# Patient Record
Sex: FEMALE | Race: WHITE
[De-identification: names, ages, dates, MRNs, and addresses within clinical notes are randomized per-mention and may not be internally consistent; named-entity substitution may affect disease eponyms.]

---

## 2017-05-08 ENCOUNTER — HOSPITAL ENCOUNTER (OUTPATIENT)
Dept: HOSPITAL 57 - HPCALD | Age: 62
End: 2017-05-08
Attending: PHYSICIAN ASSISTANT
Payer: COMMERCIAL

## 2017-05-08 DIAGNOSIS — N39.0: Primary | ICD-10-CM

## 2017-05-08 PROCEDURE — 87086 URINE CULTURE/COLONY COUNT: CPT

## 2017-07-03 ENCOUNTER — HOSPITAL ENCOUNTER (OUTPATIENT)
Dept: HOSPITAL 57 - HPCALD | Age: 62
Discharge: HOME | End: 2017-07-03
Attending: PHYSICIAN ASSISTANT
Payer: MEDICARE

## 2017-07-03 DIAGNOSIS — N39.0: Primary | ICD-10-CM

## 2017-07-03 PROCEDURE — 87086 URINE CULTURE/COLONY COUNT: CPT

## 2017-07-17 ENCOUNTER — HOSPITAL ENCOUNTER (OUTPATIENT)
Dept: HOSPITAL 57 - HPCALD | Age: 62
End: 2017-07-17
Attending: PHYSICIAN ASSISTANT
Payer: MEDICARE

## 2017-07-17 DIAGNOSIS — F31.62: Primary | ICD-10-CM

## 2017-07-17 PROCEDURE — 36415 COLL VENOUS BLD VENIPUNCTURE: CPT

## 2017-07-17 PROCEDURE — 80178 ASSAY OF LITHIUM: CPT

## 2017-07-19 ENCOUNTER — HOSPITAL ENCOUNTER (OUTPATIENT)
Dept: HOSPITAL 57 - BURULT | Age: 62
Discharge: HOME | End: 2017-07-19
Payer: MEDICARE

## 2017-07-19 DIAGNOSIS — R10.9: Primary | ICD-10-CM

## 2017-07-19 PROCEDURE — 76770 US EXAM ABDO BACK WALL COMP: CPT

## 2017-07-19 NOTE — ULT
BILATERAL RENAL ULTRASOUND 

7/19/17

 

Ultrasonography of the kidneys was performed for evaluation of flank pain. Documentary images and wo
rksheets were provided and reviewed.

 

The right kidney measures 9.7 x 4.9 x 4.3 cm and the left measures 11.0 x 5.0 x 4.0 cm. No mass or h
ydronephrosis was seen in either. The cortical thickness was slightly thinned bilaterally but symmet
rical. There were no focal renal abnormalities of concern. 

 

No masses were seen within the urinary bladder. The wall thickness was borderline at 3 mm. 

 

IMPRESSION:  

No definite acute urinary tract finding. 

 

POS: HOME

## 2017-08-07 ENCOUNTER — HOSPITAL ENCOUNTER (OUTPATIENT)
Dept: HOSPITAL 57 - HPCALD | Age: 62
Discharge: HOME | End: 2017-08-07
Attending: PHYSICIAN ASSISTANT
Payer: MEDICARE

## 2017-08-07 DIAGNOSIS — F31.62: Primary | ICD-10-CM

## 2017-08-07 PROCEDURE — 80178 ASSAY OF LITHIUM: CPT

## 2017-08-07 PROCEDURE — 36415 COLL VENOUS BLD VENIPUNCTURE: CPT

## 2017-08-14 ENCOUNTER — HOSPITAL ENCOUNTER (OUTPATIENT)
Dept: HOSPITAL 57 - BURRAD | Age: 62
Discharge: HOME | End: 2017-08-14
Attending: PHYSICIAN ASSISTANT
Payer: MEDICARE

## 2017-08-14 DIAGNOSIS — M25.561: Primary | ICD-10-CM

## 2017-08-14 DIAGNOSIS — M25.861: ICD-10-CM

## 2017-08-14 NOTE — RAD
RIGHT KNEE 4 VIEWS:

 

DATE: 8/14/17.

 

FINDINGS: 

No fracture or joint effusion was seen.  There is calcification of the menisci, however.  This can b
e seen in degenerative conditions and chondrocalcinosis (due to various deposition diseases).  The j
oint space is acceptable for age and equal between compartments.

 

IMPRESSION: 

1.  No acute findings.

2.  Meniscal calcifications.  See above.

 

POS: HOME

## 2017-08-14 NOTE — RAD
RIGHT ANKLE 3 VIEWS:

 

DATE: 8/14/17.

 

FINDINGS: 

Old fractures of the tip of the lateral malleolus are evident.  Some of the fragments are ununited. 
 This does not appear at all recent.  The ankle joint is normal in width and the articular surfaces 
are smooth.  On the oblique view, one might wonder about a little lucency in the dome of the talus l
aterally, but it is not enough to confidently diagnose an osteochondral defect.  If pain persisted, 
an MRI would be needed to fully evaluate it.

 

IMPRESSION: 

1.  Old injury of lateral malleolus.

2.  Other findings as noted.

 

POS: HOME

## 2017-09-11 ENCOUNTER — HOSPITAL ENCOUNTER (EMERGENCY)
Dept: HOSPITAL 92 - ERS | Age: 62
LOS: 1 days | Discharge: HOME | End: 2017-09-12
Payer: MEDICARE

## 2017-09-11 DIAGNOSIS — Z79.899: ICD-10-CM

## 2017-09-11 DIAGNOSIS — E78.5: ICD-10-CM

## 2017-09-11 DIAGNOSIS — I10: ICD-10-CM

## 2017-09-11 DIAGNOSIS — F31.9: ICD-10-CM

## 2017-09-11 DIAGNOSIS — F41.9: Primary | ICD-10-CM

## 2017-09-11 LAB
ALP SERPL-CCNC: 95 U/L (ref 40–150)
ALT SERPL W P-5'-P-CCNC: 69 U/L (ref 8–55)
ANION GAP SERPL CALC-SCNC: 16 MMOL/L (ref 10–20)
APAP SERPL-MCNC: (no result) MCG/ML (ref 10–30)
AST SERPL-CCNC: 71 U/L (ref 5–34)
BASOPHILS # BLD AUTO: 0 THOU/UL (ref 0–0.2)
BASOPHILS NFR BLD AUTO: 0.3 % (ref 0–1)
BILIRUB SERPL-MCNC: 0.8 MG/DL (ref 0.2–1.2)
BUN SERPL-MCNC: 20 MG/DL (ref 9.8–20.1)
CALCIUM SERPL-MCNC: 11.1 MG/DL (ref 7.8–10.44)
CHLORIDE SERPL-SCNC: 103 MMOL/L (ref 98–107)
CO2 SERPL-SCNC: 28 MMOL/L (ref 22–29)
CREAT CL PREDICTED SERPL C-G-VRATE: 0 ML/MIN (ref 70–130)
EOSINOPHIL # BLD AUTO: 0.1 THOU/UL (ref 0–0.7)
EOSINOPHIL NFR BLD AUTO: 1.8 % (ref 0–10)
GLOBULIN SER CALC-MCNC: 2.5 G/DL (ref 2.4–3.5)
HCT VFR BLD CALC: 40.5 % (ref 36–47)
LYMPHOCYTES # BLD: 1.8 THOU/UL (ref 1.2–3.4)
LYMPHOCYTES NFR BLD AUTO: 24.6 % (ref 21–51)
MONOCYTES # BLD AUTO: 0.6 THOU/UL (ref 0.11–0.59)
MONOCYTES NFR BLD AUTO: 7.7 % (ref 0–10)
NEUTROPHILS # BLD AUTO: 4.8 THOU/UL (ref 1.4–6.5)
RBC # BLD AUTO: 4.17 MILL/UL (ref 4.2–5.4)
SALICYLATES SERPL-MCNC: (no result) MG/DL (ref 15–30)
WBC # BLD AUTO: 7.3 THOU/UL (ref 4.8–10.8)

## 2017-09-11 PROCEDURE — 82550 ASSAY OF CK (CPK): CPT

## 2017-09-11 PROCEDURE — 85025 COMPLETE CBC W/AUTO DIFF WBC: CPT

## 2017-09-11 PROCEDURE — 80053 COMPREHEN METABOLIC PANEL: CPT

## 2017-09-11 PROCEDURE — 84443 ASSAY THYROID STIM HORMONE: CPT

## 2017-09-11 PROCEDURE — 36415 COLL VENOUS BLD VENIPUNCTURE: CPT

## 2017-09-11 PROCEDURE — 93005 ELECTROCARDIOGRAM TRACING: CPT

## 2017-09-11 PROCEDURE — 80307 DRUG TEST PRSMV CHEM ANLYZR: CPT

## 2017-09-11 PROCEDURE — 81003 URINALYSIS AUTO W/O SCOPE: CPT

## 2017-09-11 PROCEDURE — 87086 URINE CULTURE/COLONY COUNT: CPT

## 2017-09-11 PROCEDURE — 80306 DRUG TEST PRSMV INSTRMNT: CPT

## 2017-09-11 PROCEDURE — 81015 MICROSCOPIC EXAM OF URINE: CPT

## 2017-09-12 LAB
HYALINE CASTS #/AREA URNS LPF: (no result) LPF
RBC UR QL AUTO: (no result) HPF (ref 0–3)
WBC UR QL AUTO: (no result) HPF (ref 0–3)

## 2018-02-15 ENCOUNTER — HOSPITAL ENCOUNTER (OUTPATIENT)
Dept: HOSPITAL 57 - BURRAD | Age: 63
Discharge: HOME | End: 2018-02-15
Attending: PHYSICIAN ASSISTANT
Payer: MEDICARE

## 2018-02-15 DIAGNOSIS — M25.531: Primary | ICD-10-CM

## 2018-02-16 NOTE — RAD
RIGHT WRIST THREE VIEWS

2/15/18

 

No fracture was apparent. Carpals appeared normal. The metacarpals appear normal. 

 

IMPRESSION:  

No acute finding. 

 

POS: HOME

## 2018-02-18 ENCOUNTER — HOSPITAL ENCOUNTER (EMERGENCY)
Dept: HOSPITAL 57 - BURERS | Age: 63
Discharge: HOME | End: 2018-02-18
Payer: MEDICARE

## 2018-02-18 DIAGNOSIS — E11.9: ICD-10-CM

## 2018-02-18 DIAGNOSIS — Z79.899: ICD-10-CM

## 2018-02-18 DIAGNOSIS — S93.601A: Primary | ICD-10-CM

## 2018-02-18 DIAGNOSIS — E78.5: ICD-10-CM

## 2018-02-18 DIAGNOSIS — I10: ICD-10-CM
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
## 2018-02-18 DIAGNOSIS — S82.831K: ICD-10-CM

## 2018-02-18 NOTE — RAD
RIGHT ANKLE THREE VIEWS:

 

Date: 2-18-18

 

Comparison: 8-14-17

 

FINDINGS: 

Soft tissue swelling is present around the ankle, a little more medially than laterally today. An old
 ununited fracture at the tip of the lateral malleolus is present as before. No new fractures or new 
bony changes were appreciated. A tiny calcaneal spur was noted. 

 

IMPRESSION: 

Soft tissue swelling. Old ununited distal fibular fracture. 

 

POS: HOME

## 2018-03-08 ENCOUNTER — HOSPITAL ENCOUNTER (INPATIENT)
Dept: HOSPITAL 57 - BURERS | Age: 63
LOS: 2 days | Discharge: HOME | DRG: 191 | End: 2018-03-10
Attending: FAMILY MEDICINE | Admitting: FAMILY MEDICINE
Payer: MEDICARE

## 2018-03-08 VITALS — BODY MASS INDEX: 26.2 KG/M2

## 2018-03-08 DIAGNOSIS — I10: ICD-10-CM

## 2018-03-08 DIAGNOSIS — E11.9: ICD-10-CM

## 2018-03-08 DIAGNOSIS — J44.1: Primary | ICD-10-CM

## 2018-03-08 DIAGNOSIS — F17.210: ICD-10-CM

## 2018-03-08 DIAGNOSIS — F31.9: ICD-10-CM

## 2018-03-08 DIAGNOSIS — M25.571: ICD-10-CM

## 2018-03-08 DIAGNOSIS — Z85.820: ICD-10-CM

## 2018-03-08 DIAGNOSIS — N30.00: ICD-10-CM

## 2018-03-08 LAB
ALBUMIN SERPL BCG-MCNC: 4.7 G/DL (ref 3.5–5)
ALP SERPL-CCNC: 92 U/L (ref 40–150)
ALT SERPL W P-5'-P-CCNC: 19 U/L (ref 8–55)
ANION GAP SERPL CALC-SCNC: 13 MMOL/L (ref 10–20)
AST SERPL-CCNC: 13 U/L (ref 5–34)
BACTERIA UR QL AUTO: (no result) HPF
BASOPHILS # BLD AUTO: 0 THOU/UL (ref 0–0.2)
BASOPHILS NFR BLD AUTO: 0.3 % (ref 0–1)
BILIRUB SERPL-MCNC: 0.5 MG/DL (ref 0.2–1.2)
BUN SERPL-MCNC: 19 MG/DL (ref 9.8–20.1)
CALCIUM SERPL-MCNC: 11.1 MG/DL (ref 7.8–10.44)
CHLORIDE SERPL-SCNC: 103 MMOL/L (ref 98–107)
CK MB SERPL-MCNC: 2.5 NG/ML (ref 0–6.6)
CO2 SERPL-SCNC: 28 MMOL/L (ref 22–29)
CREAT CL PREDICTED SERPL C-G-VRATE: 0 ML/MIN (ref 70–130)
EOSINOPHIL # BLD AUTO: 0.1 THOU/UL (ref 0–0.7)
EOSINOPHIL NFR BLD AUTO: 0.7 % (ref 0–10)
GLOBULIN SER CALC-MCNC: 2.5 G/DL (ref 2.4–3.5)
GLUCOSE SERPL-MCNC: 118 MG/DL (ref 70–105)
HGB BLD-MCNC: 13.2 G/DL (ref 12–16)
HYALINE CASTS #/AREA URNS LPF: (no result) LPF
LYMPHOCYTES # BLD AUTO: 1.2 THOU/UL (ref 1.2–3.4)
LYMPHOCYTES NFR BLD AUTO: 11.8 % (ref 21–51)
MCH RBC QN AUTO: 31 PG (ref 27–31)
MCV RBC AUTO: 92.7 FL (ref 81–99)
MONOCYTES # BLD AUTO: 0.5 THOU/UL (ref 0.11–0.59)
MONOCYTES NFR BLD AUTO: 4.9 % (ref 0–10)
NEUTROPHILS # BLD AUTO: 8.6 THOU/UL (ref 1.4–6.5)
NEUTROPHILS NFR BLD AUTO: 82.3 % (ref 42–75)
PLATELET # BLD AUTO: 263 THOU/UL (ref 130–400)
POTASSIUM SERPL-SCNC: 3.8 MMOL/L (ref 3.5–5.1)
RBC # BLD AUTO: 4.27 MILL/UL (ref 4.2–5.4)
SODIUM SERPL-SCNC: 140 MMOL/L (ref 136–145)
SP GR UR STRIP: 1.01 (ref 1–1.03)
TROPONIN I SERPL DL<=0.01 NG/ML-MCNC: 0.01 NG/ML (ref ?–0.03)
WBC # BLD AUTO: 10.5 THOU/UL (ref 4.8–10.8)
WBC UR QL AUTO: (no result) HPF (ref 0–3)

## 2018-03-08 PROCEDURE — 94760 N-INVAS EAR/PLS OXIMETRY 1: CPT

## 2018-03-08 PROCEDURE — 84484 ASSAY OF TROPONIN QUANT: CPT

## 2018-03-08 PROCEDURE — 80178 ASSAY OF LITHIUM: CPT

## 2018-03-08 PROCEDURE — 81003 URINALYSIS AUTO W/O SCOPE: CPT

## 2018-03-08 PROCEDURE — 85025 COMPLETE CBC W/AUTO DIFF WBC: CPT

## 2018-03-08 PROCEDURE — 36416 COLLJ CAPILLARY BLOOD SPEC: CPT

## 2018-03-08 PROCEDURE — 83880 ASSAY OF NATRIURETIC PEPTIDE: CPT

## 2018-03-08 PROCEDURE — 82553 CREATINE MB FRACTION: CPT

## 2018-03-08 PROCEDURE — 80053 COMPREHEN METABOLIC PANEL: CPT

## 2018-03-08 PROCEDURE — 71045 X-RAY EXAM CHEST 1 VIEW: CPT

## 2018-03-08 PROCEDURE — 96375 TX/PRO/DX INJ NEW DRUG ADDON: CPT

## 2018-03-08 PROCEDURE — 94640 AIRWAY INHALATION TREATMENT: CPT

## 2018-03-08 PROCEDURE — 93005 ELECTROCARDIOGRAM TRACING: CPT

## 2018-03-08 PROCEDURE — 81015 MICROSCOPIC EXAM OF URINE: CPT

## 2018-03-08 PROCEDURE — 96365 THER/PROPH/DIAG IV INF INIT: CPT

## 2018-03-08 PROCEDURE — 87086 URINE CULTURE/COLONY COUNT: CPT

## 2018-03-08 PROCEDURE — A4216 STERILE WATER/SALINE, 10 ML: HCPCS

## 2018-03-08 RX ADMIN — Medication PRN ML: at 23:13

## 2018-03-08 NOTE — RAD
PORTABLE CHEST  

3/8/18

 

An AP portable film at 1708 is compared with a 12/31/16 study.

 

Haziness to the left of the heart was present before and is actually better today than previously. As
jarod from this, the lungs are clear. There are no effusions. No congestive changes are present. The he
art size is normal. 

 

IMPRESSION:  

Mild scarring on the left but no acute findings. 

 

POS: HOME

## 2018-03-09 RX ADMIN — Medication PRN ML: at 10:03

## 2018-03-09 RX ADMIN — Medication PRN ML: at 10:02

## 2018-03-09 RX ADMIN — Medication PRN ML: at 20:55

## 2018-03-09 RX ADMIN — GUAIFENESIN AND DEXTROMETHORPHAN PRN ML: 100; 10 SYRUP ORAL at 18:30

## 2018-03-09 RX ADMIN — GUAIFENESIN AND DEXTROMETHORPHAN PRN ML: 100; 10 SYRUP ORAL at 12:24

## 2018-03-09 NOTE — HP
CHIEF COMPLAINT:  Cough.

 

HISTORY OF PRESENT ILLNESS:  Ms. Borja is a 62-year-old  female with past medical hist
ory of asthma and tobacco abuse who presented to the emergency room complaining of 2 days of increasi
ng cough, productive sputum, and shortness of breath.  She denies fever, hemoptysis, chest pain, leg 
edema.

 

In the emergency room, she was notably hypoxic on room air and required neb treatments and oxygen the
rapy.  Her O2 sat subsequently improved and she has been admitted for COPD exacerbation.

 

The patient also with a history of ankle injury in August of last year.  She also had a recent fall a
nd was evaluated through the emergency room in February.  At that time, an x-ray was performed that s
howed an old lateral malleolar fracture, but no acute findings and was diagnosed with an ankle sprain
.  She followed up in the outpatient clinic with her PCP Ms. Meghna Moore and was instructed to cont
inue ibuprofen as needed and given education regarding ankle sprains.  The patient reports she has ha
d improvement of the swelling, but still has persistent pain in the ankle.

 

PAST MEDICAL HISTORY:

1.  Diabetes, diet controlled.

2.  Bipolar disorder, followed by [facility] with recent hospitalization [facility].

3.  Malignant melanoma.  

 

PAST SURGICAL HISTORY:  

1.  Tonsillectomy.

2.  Appendectomy.

3.  Skin cancer removal, right shoulder.

4.  Laparoscopic cholecystectomy.

5.  Total hysterectomy.

 

FAMILY HISTORY:  Both her mother and father are  and history is otherwise noncontributory.

 

SOCIAL HISTORY:  Denies alcohol or illicit drug use.  Admits to smoking, but states she only smokes 3
 cigarettes per day.

 

CURRENT MEDICATIONS:

1.  ProAir HFA 2 puffs inhaled q.6h. p.r.n. shortness of breath.

2.  Meloxicam 15 mg p.o. daily.

3.  BuSpar 5 mg p.o. b.i.d.

4.  Trazodone 150 mg p.o. at bedtime.

5.  Risperdal 3 mg p.o. at bedtime.

6.  Lithium 300 mg q.a.m. and 600 mg at bedtime.

7.  Sertraline 100 mg p.o. daily.

 

ALLERGIES:  No known drug allergies.

 

REVIEW OF SYSTEMS:  Ten system review is positive for cough, sputum production, pleuritic pain with c
ough.  Right ankle pain, bipolar disorder, otherwise negative.

 

PHYSICAL EXAMINATION:

VITAL SIGNS:  Temperature 97.6, heart rate 99, respirations 20, 96% O2 sat on 2 liters per minute, bl
ood pressure 142/65.

GENERAL:  Well-developed, well-nourished  female in no acute distress.  She is alert and ruth
ented x3.

HEENT:  Normocephalic, atraumatic.  Pupils equal, round, reactive to light and accommodation.  Extrao
cular muscles intact.  Nares are patent without discharge.  Tongue protrudes in the midline.

NECK:  Supple, without lymphadenopathy, thyromegaly, JVD or bruit.

HEART:  Regular rate and rhythm, normal S1, S2.  No murmurs, clicks, rubs, or gallops.

LUNGS:  Diminished air entry throughout with coarse cough and a very rare expiratory wheeze at the ba
ses.

ABDOMEN:  Positive bowel sounds in all 4 quadrants.  Soft, nontender, nondistended, no masses, guardi
ng, or rebound tenderness.

EXTREMITIES:  No cyanosis, clubbing, edema.  The right ankle is without swelling and has tenderness t
o palpation over the ATFL just inferior to the lateral malleolus on the right.

NEUROLOGIC:  Cranial nerves II-XII grossly intact.  No focal deficits.

 

LABORATORY DATA:  White count 10.5, hemoglobin 13.2, hematocrit 39.6, platelets 263, sodium 140, pota
ssium 3.8, chloride 103, bicarbonate 28, BUN 19, creatinine 0.79, glucose 118, calcium 1.1, total donald
irubin 0.5, AST 13, ALT 19, alkaline phosphatase 92, CK-MB 2.5, troponin I 0.010, BNP 49.7, serum tot
al protein 7.2, albumin 4.7.  Urinalysis significant for large leukocyte esterase, 7-10 WBC, 1+ bacte
neville, otherwise unremarkable.  Lithium 0.868.

 

IMAGING:  Chest x-ray;  mild scarring on the left, no acute findings.

 

ASSESSMENT AND PLAN:

1.  Chronic obstructive pulmonary disease exacerbation.  The patient will be continued on Solu-Medrol
 IV, supportive nebs and O2 p.r.n. to keep sats greater than 90%.  The patient will be started on Cip
ro.  Follow up blood cultures.

2.  Acute cystitis.  Urine culture has been performed and we will follow that.  Continue Cipro.

3.  Right ankle pain.  I will review her charting to determine what is most appropriate.  She may sammie
ely need air stirrup cast with weightbearing.  We will continue her Meloxicam with Tylenol p.r.n. for
 breakthrough pain.

4.  Tobacco abuse.  I have discussed a nicotine patch with the patient, but she declines at this time
.  She has been advised that she cannot smoke.  Smoking cessation education will be given while hospi
talized.

5.  Diet controlled diabetes.  We will place the patient on Accu-Cheks.  Diabetic diet, con carb at 1
800 calories per day.

6.  Bipolar disorder.  The patient will be continued on her psych regimen.

7.  Prophylaxis.  The patient will be given Lovenox for prophylaxis and sequential compression device
s as well as Pepcid.

8.  CODE STATUS:  Full code.

## 2018-03-10 VITALS — SYSTOLIC BLOOD PRESSURE: 180 MMHG | TEMPERATURE: 97.5 F | DIASTOLIC BLOOD PRESSURE: 79 MMHG

## 2018-03-10 RX ADMIN — GUAIFENESIN AND DEXTROMETHORPHAN PRN ML: 100; 10 SYRUP ORAL at 08:15

## 2018-03-10 RX ADMIN — GUAIFENESIN AND DEXTROMETHORPHAN PRN ML: 100; 10 SYRUP ORAL at 00:00

## 2018-03-10 RX ADMIN — Medication PRN ML: at 08:12

## 2018-03-10 NOTE — DIS
DATE OF ADMISSION:  03/08/2018

 

DATE OF DISCHARGE:  03/10/2018

 

ADMISSION DIAGNOSES:

1.  Chronic obstructive pulmonary disease exacerbation.

2.  Acute cystitis.

3.  Bipolar disorder.

4.  Diet-controlled diabetes.

 

DISCHARGE DIAGNOSES:

1.  Chronic obstructive pulmonary disease exacerbation.

2.  Acute cystitis.

3.  Bipolar disorder.

4.  Diet-controlled diabetes.

5.  Hypertension.

 

ATTENDING PHYSICIAN:  Dr. Zoya Stephenson.

 

HISTORY AND PHYSICAL:  Please see dictated report from date of admission.

 

PROCEDURES:  Chest x-ray from the date of admission showing mild scarring on the left, but no acute f
indings.

 

HOSPITAL COURSE:  Ms. Borja is a 62-year-old  female with past medical history of COPD
, who presented to the emergency department with worsening cough and shortness of breath.  She was no
tably hypoxic upon presentation, which improved with nebulizer treatments, supportive O2.  She was ad
mitted for COPD exacerbation, placed on IV steroid therapy.  She was started on Cipro.  Her urinalysi
s was also suspicious for urinary tract infection.  The patient's condition continued to improve such
 that on the date of discharge, she denies shortness of breath.  She is still having a productive cou
gh of clear sputum.  She is satting 90%-92% on room air and is ambulating in the halls without oxygen
.  She has been noncompliant with her oxygen therapy throughout her stay here.  She was requesting at
 this time to go home.

 

On examination, she has rare wheeze at the base and coarse breath sounds.  She does not have a home n
ebulizer and this has been prescribed for her as well as DuoNeb treatments.  She will be discharged o
n a prednisone taper for approximately 14 days and a complete course of Cipro.

 

Regarding her urinary tract infection, urine culture on the date of discharge shows rare growth with 
blood culture in progress.

 

Patient with history of tobacco abuse.  She admitted to me only smoking approximately 3 cigarettes pe
r day.  I had initially offered nicotine patch, which she declines.  Then, she was requesting to go o
utside and smoke.  The patient was instructed that this is a smoke-free campus.  Nicotine patch was o
rdered for the patient.  The patient continued to go outside to attempt to smoke while hospitalized a
nd was thoroughly counseled on smoking cessation during her hospital stay.

 

The patient was notably hypertensive throughout her hospitalization with systolic blood pressure rang
ing from 142-180 and diastolic blood pressure ranging from 65-77.  The patient has been started on ne
w medication of amlodipine 5 mg daily.  This should be followed up in the outpatient setting to ensur
e good control.  The patient is also advised to check her blood pressure at least twice weekly and ke
ep a log to bring to her followup visit with her PCP.

 

The patient complained of right ankle pain upon admission.  She had had a previous injury in 08/2017.
  A recent ER visit confirmed an old lateral malleolar fracture, but no acute findings and therefore 
was diagnosed with an ankle sprain.  The patient was placed in an Aircast during her hospitalization 
here, which she can wean over a period of approximately 6 weeks.  She has been instructed range of mo
tion exercises for the ankle.  The patient had no notable swelling on examination and tenderness to p
alpation over the ATFL on examination.  She will continue Meloxicam and Tylenol in the outpatient set
ting.

 

The patient with history of bipolar disorder.  She had recent hospitalization at [facility].  Her medication regimen was not changed during her hospitalization.

 

The patient reported a past history of diabetes that is diet controlled.  I did thoroughly review her
 chart and she has only had minimal elevations of her glucose documented.  She may have some hypergly
cemia associated with her prednisone taper.  Therefore, I have instructed her to continue diabetic di
et.

 

DISPOSITION:  Discharged to home.

 

CONDITION:  Good.

 

DISCHARGE MEDICATIONS:

1.  ProAir HFA 2 puffs q.4 hours p.r.n. shortness of breath.

2.  Meloxicam 15 mg p.o. daily.

3.  BuSpar 5 mg p.o. b.i.d.

4.  Trazodone 150 mg p.o. at bedtime.

5.  Risperdal 3 mg p.o. at bedtime.

6.  Lithium 300 mg p.o. q.a.m. and 600 mg p.o. at bedtime.

7.  Zoloft 100 mg p.o. daily.

8.  Prednisone taper 20 mg 3 p.o. x4 days, then 2 p.o. x4 days, then 1 p.o. x3 days, then half p.o. x
3 days, then stop.

9.  Nystatin apply to affected area topically b.i.d.

10.  Nebulizer pressor system #1 to be used with DuoNeb.

11.  DuoNeb 3 mL nebulized t.i.d., p.r.n. shortness of breath and cough.

12.  Cipro 500 mg p.o. b.i.d. x10 additional doses to complete a full 7-day course.

13.  Amlodipine 5 mg p.o. daily.

 

The patient is instructed to follow up with her primary care provider, Genevieve Moore, in approxi
mately 10 days.

## 2018-04-11 ENCOUNTER — HOSPITAL ENCOUNTER (EMERGENCY)
Dept: HOSPITAL 57 - BURERS | Age: 63
End: 2018-04-11
Payer: MEDICARE

## 2018-04-11 DIAGNOSIS — I10: ICD-10-CM

## 2018-04-11 DIAGNOSIS — F31.9: ICD-10-CM

## 2018-04-11 DIAGNOSIS — J44.1: Primary | ICD-10-CM

## 2018-04-11 DIAGNOSIS — E11.9: ICD-10-CM

## 2018-04-11 DIAGNOSIS — F17.210: ICD-10-CM

## 2018-04-11 DIAGNOSIS — N39.0: ICD-10-CM

## 2018-04-11 DIAGNOSIS — E78.5: ICD-10-CM

## 2018-04-11 LAB
ACTUAL BICARBONATE (HCO3V): 30 MEQ/L (ref 24–30)
ALBUMIN SERPL BCG-MCNC: 4 G/DL (ref 3.5–5)
ALP SERPL-CCNC: 66 U/L (ref 40–150)
ALT SERPL W P-5'-P-CCNC: 13 U/L (ref 8–55)
ANION GAP SERPL CALC-SCNC: 11 MMOL/L (ref 10–20)
APAP SERPL-MCNC: (no result) MCG/ML (ref 10–30)
AST SERPL-CCNC: 10 U/L (ref 5–34)
BACTERIA UR QL AUTO: (no result) HPF
BASE EXCESS BLDA CALC-SCNC: 3.6 MEQ/L
BASOPHILS # BLD AUTO: 0 THOU/UL (ref 0–0.2)
BASOPHILS NFR BLD AUTO: 0.5 % (ref 0–1)
BILIRUB SERPL-MCNC: 0.4 MG/DL (ref 0.2–1.2)
BUN SERPL-MCNC: 10 MG/DL (ref 9.8–20.1)
CALCIUM SERPL-MCNC: 10.3 MG/DL (ref 7.8–10.44)
CHLORIDE SERPL-SCNC: 108 MMOL/L (ref 98–107)
CO2 SERPL-SCNC: 29 MMOL/L (ref 22–29)
CREAT CL PREDICTED SERPL C-G-VRATE: 0 ML/MIN (ref 70–130)
EOSINOPHIL # BLD AUTO: 0.1 THOU/UL (ref 0–0.7)
EOSINOPHIL NFR BLD AUTO: 0.7 % (ref 0–10)
GLOBULIN SER CALC-MCNC: 2.1 G/DL (ref 2.4–3.5)
GLUCOSE SERPL-MCNC: 97 MG/DL (ref 70–105)
HEMOGLOBIN (HB): 13.8 G/DL (ref 11.7–16)
HGB BLD-MCNC: 12.8 G/DL (ref 12–16)
LYMPHOCYTES # BLD AUTO: 0.9 THOU/UL (ref 1.2–3.4)
LYMPHOCYTES NFR BLD AUTO: 11.2 % (ref 21–51)
MCH RBC QN AUTO: 28.6 PG (ref 27–31)
MCV RBC AUTO: 92.8 FL (ref 81–99)
MONOCYTES # BLD AUTO: 0.4 THOU/UL (ref 0.11–0.59)
MONOCYTES NFR BLD AUTO: 5.2 % (ref 0–10)
NEUTROPHILS # BLD AUTO: 6.9 THOU/UL (ref 1.4–6.5)
NEUTROPHILS NFR BLD AUTO: 82.4 % (ref 42–75)
PLATELET # BLD AUTO: 180 THOU/UL (ref 130–400)
POTASSIUM SERPL-SCNC: 4.2 MMOL/L (ref 3.5–5.1)
RBC # BLD AUTO: 4.46 MILL/UL (ref 4.2–5.4)
RBC UR QL AUTO: (no result) HPF (ref 0–3)
SALICYLATES SERPL-MCNC: (no result) MG/DL (ref 15–30)
SODIUM SERPL-SCNC: 144 MMOL/L (ref 136–145)
SP GR UR STRIP: 1.01 (ref 1–1.03)
WBC # BLD AUTO: 8.4 THOU/UL (ref 4.8–10.8)
WBC UR QL AUTO: (no result) HPF (ref 0–3)

## 2018-04-11 PROCEDURE — 36416 COLLJ CAPILLARY BLOOD SPEC: CPT

## 2018-04-11 PROCEDURE — 71045 X-RAY EXAM CHEST 1 VIEW: CPT

## 2018-04-11 PROCEDURE — 81003 URINALYSIS AUTO W/O SCOPE: CPT

## 2018-04-11 PROCEDURE — 84443 ASSAY THYROID STIM HORMONE: CPT

## 2018-04-11 PROCEDURE — 82805 BLOOD GASES W/O2 SATURATION: CPT

## 2018-04-11 PROCEDURE — 85025 COMPLETE CBC W/AUTO DIFF WBC: CPT

## 2018-04-11 PROCEDURE — 80307 DRUG TEST PRSMV CHEM ANLYZR: CPT

## 2018-04-11 PROCEDURE — 81015 MICROSCOPIC EXAM OF URINE: CPT

## 2018-04-11 PROCEDURE — 80053 COMPREHEN METABOLIC PANEL: CPT

## 2018-04-11 PROCEDURE — 93005 ELECTROCARDIOGRAM TRACING: CPT

## 2018-04-11 NOTE — RAD
PORTABLE CHEST:

4/11/18

 

An AP portable film at 1249 is compared with a 3/8/18 study.

 

There has been no adverse interval change. 

 

The heart is normal in size. There is no congestive change or pleural effusion. A little haziness in 
the left base is no different than before, and part of this is due to overlying breast tissue. A calc
ified granuloma is seen in the left base as before. The trachea is midline. 

 

IMPRESSION:  

No acute thoracic finding. 

 

POS: HOME

## 2018-08-13 ENCOUNTER — HOSPITAL ENCOUNTER (OUTPATIENT)
Dept: HOSPITAL 92 - ERS | Age: 63
Setting detail: OBSERVATION
LOS: 2 days | Discharge: HOME | End: 2018-08-15
Attending: INTERNAL MEDICINE | Admitting: INTERNAL MEDICINE
Payer: MEDICARE

## 2018-08-13 VITALS — BODY MASS INDEX: 26 KG/M2

## 2018-08-13 DIAGNOSIS — F41.8: ICD-10-CM

## 2018-08-13 DIAGNOSIS — N39.0: ICD-10-CM

## 2018-08-13 DIAGNOSIS — E87.6: ICD-10-CM

## 2018-08-13 DIAGNOSIS — M62.82: Primary | ICD-10-CM

## 2018-08-13 DIAGNOSIS — F25.0: ICD-10-CM
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
## 2018-08-13 DIAGNOSIS — Z88.5: ICD-10-CM

## 2018-08-13 DIAGNOSIS — E11.9: ICD-10-CM

## 2018-08-13 DIAGNOSIS — Z88.0: ICD-10-CM

## 2018-08-13 DIAGNOSIS — Z79.899: ICD-10-CM

## 2018-08-13 DIAGNOSIS — E78.5: ICD-10-CM

## 2018-08-13 DIAGNOSIS — I10: ICD-10-CM

## 2018-08-13 DIAGNOSIS — F17.210: ICD-10-CM

## 2018-08-13 LAB
ALBUMIN SERPL BCG-MCNC: 4.2 G/DL (ref 3.5–5)
ALP SERPL-CCNC: 82 U/L (ref 40–150)
ALT SERPL W P-5'-P-CCNC: 35 U/L (ref 8–55)
ANION GAP SERPL CALC-SCNC: 12 MMOL/L (ref 10–20)
APAP SERPL-MCNC: (no result) MCG/ML (ref 10–30)
AST SERPL-CCNC: 69 U/L (ref 5–34)
BASOPHILS # BLD AUTO: 0 THOU/UL (ref 0–0.2)
BASOPHILS NFR BLD AUTO: 0.6 % (ref 0–1)
BILIRUB SERPL-MCNC: 0.7 MG/DL (ref 0.2–1.2)
BUN SERPL-MCNC: 7 MG/DL (ref 9.8–20.1)
CALCIUM SERPL-MCNC: 10.4 MG/DL (ref 7.8–10.44)
CHLORIDE SERPL-SCNC: 103 MMOL/L (ref 98–107)
CK SERPL-CCNC: 1673 U/L (ref 29–168)
CO2 SERPL-SCNC: 27 MMOL/L (ref 22–29)
CREAT CL PREDICTED SERPL C-G-VRATE: 0 ML/MIN (ref 70–130)
CRYSTAL-AUWI FLAG: 0 (ref 0–15)
DRUG SCREEN CUTOFF: (no result)
EOSINOPHIL # BLD AUTO: 0.1 THOU/UL (ref 0–0.7)
EOSINOPHIL NFR BLD AUTO: 1.8 % (ref 0–10)
GLOBULIN SER CALC-MCNC: 2.2 G/DL (ref 2.4–3.5)
GLUCOSE SERPL-MCNC: 93 MG/DL (ref 70–105)
HEV IGM SER QL: 4.3 (ref 0–7.99)
HGB BLD-MCNC: 13.7 G/DL (ref 12–16)
HYALINE CASTS #/AREA URNS LPF: (no result) LPF
LYMPHOCYTES # BLD: 0.9 THOU/UL (ref 1.2–3.4)
LYMPHOCYTES NFR BLD AUTO: 12.8 % (ref 21–51)
MCH RBC QN AUTO: 31.6 PG (ref 27–31)
MCV RBC AUTO: 94.5 FL (ref 78–98)
MEDTOX CONTROL LINE VALID?: (no result)
MEDTOX READER #: (no result)
MONOCYTES # BLD AUTO: 0.6 THOU/UL (ref 0.11–0.59)
MONOCYTES NFR BLD AUTO: 9.3 % (ref 0–10)
NEUTROPHILS # BLD AUTO: 5.1 THOU/UL (ref 1.4–6.5)
NEUTROPHILS NFR BLD AUTO: 75.5 % (ref 42–75)
PATHC CAST-AUWI FLAG: 0.29 (ref 0–2.49)
PLATELET # BLD AUTO: 189 THOU/UL (ref 130–400)
POTASSIUM SERPL-SCNC: 3 MMOL/L (ref 3.5–5.1)
PREGU CONTROL BACKGROUND?: (no result)
PREGU CONTROL BAR APPEAR?: YES
RBC # BLD AUTO: 4.33 MILL/UL (ref 4.2–5.4)
RBC UR QL AUTO: (no result) HPF (ref 0–3)
SALICYLATES SERPL-MCNC: (no result) MG/DL (ref 15–30)
SODIUM SERPL-SCNC: 139 MMOL/L (ref 136–145)
SP GR UR STRIP: 1.01 (ref 1–1.04)
SPERM-AUWI FLAG: 0 (ref 0–9.9)
WBC # BLD AUTO: 6.7 THOU/UL (ref 4.8–10.8)
WBC UR QL AUTO: (no result) HPF (ref 0–3)
YEAST-AUWI FLAG: 0 (ref 0–25)

## 2018-08-13 PROCEDURE — 87086 URINE CULTURE/COLONY COUNT: CPT

## 2018-08-13 PROCEDURE — 80306 DRUG TEST PRSMV INSTRMNT: CPT

## 2018-08-13 PROCEDURE — 70450 CT HEAD/BRAIN W/O DYE: CPT

## 2018-08-13 PROCEDURE — 81025 URINE PREGNANCY TEST: CPT

## 2018-08-13 PROCEDURE — G0378 HOSPITAL OBSERVATION PER HR: HCPCS

## 2018-08-13 PROCEDURE — 97139 UNLISTED THERAPEUTIC PX: CPT

## 2018-08-13 PROCEDURE — 81003 URINALYSIS AUTO W/O SCOPE: CPT

## 2018-08-13 PROCEDURE — 99285 EMERGENCY DEPT VISIT HI MDM: CPT

## 2018-08-13 PROCEDURE — 81015 MICROSCOPIC EXAM OF URINE: CPT

## 2018-08-13 PROCEDURE — 83735 ASSAY OF MAGNESIUM: CPT

## 2018-08-13 PROCEDURE — 80053 COMPREHEN METABOLIC PANEL: CPT

## 2018-08-13 PROCEDURE — A4216 STERILE WATER/SALINE, 10 ML: HCPCS

## 2018-08-13 PROCEDURE — 80178 ASSAY OF LITHIUM: CPT

## 2018-08-13 PROCEDURE — 96361 HYDRATE IV INFUSION ADD-ON: CPT

## 2018-08-13 PROCEDURE — 96376 TX/PRO/DX INJ SAME DRUG ADON: CPT

## 2018-08-13 PROCEDURE — 85025 COMPLETE CBC W/AUTO DIFF WBC: CPT

## 2018-08-13 PROCEDURE — 93005 ELECTROCARDIOGRAM TRACING: CPT

## 2018-08-13 PROCEDURE — 84443 ASSAY THYROID STIM HORMONE: CPT

## 2018-08-13 PROCEDURE — 36415 COLL VENOUS BLD VENIPUNCTURE: CPT

## 2018-08-13 PROCEDURE — 80307 DRUG TEST PRSMV CHEM ANLYZR: CPT

## 2018-08-13 PROCEDURE — 96365 THER/PROPH/DIAG IV INF INIT: CPT

## 2018-08-13 PROCEDURE — 96367 TX/PROPH/DG ADDL SEQ IV INF: CPT

## 2018-08-13 PROCEDURE — 82550 ASSAY OF CK (CPK): CPT

## 2018-08-13 PROCEDURE — 96366 THER/PROPH/DIAG IV INF ADDON: CPT

## 2018-08-13 PROCEDURE — 96375 TX/PRO/DX INJ NEW DRUG ADDON: CPT

## 2018-08-13 NOTE — CT
HEAD CT NONCONTRAST:

8/13/18

 

COMPARISON:  

12/31/16

 

INDICATION:

Head injury, pain.

 

FINDINGS:  

There is no acute intracranial hemorrhage, mass effect or midline shift. Ventricular system is normal
 in size. Calvarium is intact, without evidence of pneumocephalus.

 

IMPRESSION:  

No acute intracranial hemorrhage or mass effect. 

 

POS: [facility]

## 2018-08-14 RX ADMIN — Medication SCH ML: at 09:16

## 2018-08-14 RX ADMIN — POTASSIUM CHLORIDE AND SODIUM CHLORIDE SCH MLS: 450; 150 INJECTION, SOLUTION INTRAVENOUS at 16:17

## 2018-08-14 RX ADMIN — POTASSIUM CHLORIDE AND SODIUM CHLORIDE SCH MLS: 450; 150 INJECTION, SOLUTION INTRAVENOUS at 09:15

## 2018-08-14 RX ADMIN — Medication SCH ML: at 21:16

## 2018-08-14 NOTE — HP
PRIMARY CARE PHYSICIAN:  Genevieve Moore PA-C.

 

REASON FOR ADMISSION:  Rhabdomyolysis.

 

HISTORY OF PRESENT ILLNESS:  A 62-year-old female who has underlying history of bipolar disorder as w
ell as schizoaffective disorder.  She is taking multiple psychiatric medications.  Yesterday around 1
1:00 a.m., she fell down at home.  She reports that she was in her laundry and she fell over laundry 
basket and she remained seated in laundry basket for 20 minutes, then she hit her head.  She denies a
ny loss of consciousness.  She was having headaches since the fall.  She did not have any nausea or v
omiting.  Subsequently, the patient was able to get off from the floor and in the noon time, she fell
 down again and twisted her ankle and subsequently in evening time, she fell down again and twisted h
er knee.  She was hurting in her knee and ankle on the right side, but she was able to walk without a
ny problem.  She was not staggering.  She did not have any motor weakness.  She did not have any sens
ory symptoms.  She denies any associated chest pain, palpitation, shortness of breath.

 

This patient has underlying psychiatric problem and as per report, the patient has intermittently marva
cidal ideation as well as homicidal ideation.  She denied any suicidal ideation or homicidal ideation
 to me this morning, but she reports that she intermittently feels that way.  Sometimes, she hears vo
ice of her  who passed away.  She denies any UTI symptoms, though her urinalysis did show find
ing suggestive of UTI.  The patient reports that about a week ago, she was feeling frequency, dysuria
 and she did not put any attention to it as well as she was not on any antibiotic therapy.  She denie
s any diarrhea.  She denies any constipation, melena or hematochezia.  At this point, the patient fee
ls better after emergency room evaluation and treatment.  In the emergency room, this patient had CT 
brain which did not show any acute process.  The patient also currently denies any ankle pain or any 
knee pain on the right side.  She was able to walk on the floor after admission.

 

REVIEW OF SYSTEMS:  The following complete review of systems was negative, unless otherwise mentioned
 in the HPI or below:

Constitutional:  Weight loss or gain, ability to conduct usual activities.

Skin:  Rash, itching.

Eyes:  Double vision, pain.

ENT/Mouth:  Nose bleeding, neck stiffness, pain, tenderness.

Cardiovascular:  Palpitations, dyspnea on exertion, orthopnea.

Respiratory:  Shortness of breath, wheezing, cough, hemoptysis, fever or night sweats.

Gastrointestinal:  Poor appetite, abdominal pain, heartburn, nausea, vomiting, constipation, or diarr
hea.

Genitourinary:  Urgency, frequency, dysuria, nocturia.

Musculoskeletal:  Pain, swelling.

Neurologic/Psychiatric:  Anxiety, depression.

Allergy/Immunologic:  Skin rash, bleeding tendency.

 

Please see my HPI for pertinent positive and negative.  All other review of system reviewed and negat
michelle except as mentioned in the HPI.

 

PAST MEDICAL HISTORY:  The patient has history of diet controlled diabetes, hypertension, dyslipidemi
a.

 

PAST PSYCHIATRIC HISTORY:  Anxiety, depression, bipolar disorder, schizoaffective disorder.  Patient 
has history of inpatient psychiatric admission in [facility].

 

PAST SURGICAL HISTORY:  Appendicectomy, tonsillectomy, cholecystectomy, hysterectomy, history of skin
 cancer removal, history of surgery for fractured neck.

 

FAMILY HISTORY:  The patient is .  She lives at home by herself.  Her niece is the medical [facility] of  as well as her sister.  Her daughter lives in [place].  No family history of coronary a
rtery disease, stroke or cancer.

 

SOCIAL HISTORY:  The patient lives by herself at home.  She smokes about half pack per day.  She kip
es any alcohol abuse.  She denies any other illicit drug abuse.

 

ALLERGIES:  CODEINE, MORPHINE, PENICILLIN.

 

CURRENT HOME MEDICATIONS:  ProAir HFA 2 puffs q.6 hourly p.r.n., lithium carbonate 300 mg p.o. b.i.d.
, risperidone 3 mg p.o. at bedtime, Zoloft 200 mg p.o. daily, trazodone 300 mg p.o. at bedtime, Cymba
lta 30 mg p.o. daily.

 

EMERGENCY ROOM COURSE:  The patient is given IV fluid, potassium chloride and potassium chloride, K-D
ur 40 mEq p.o. and Tylenol 1 gram.

 

PHYSICAL EXAMINATION:

VITAL SIGNS:  Currently, blood pressure 137/66, pulse 93, respiratory rate 18, temperature 98.2, satu
ration 94% on room air, weight 68 kilograms.

GENERAL:  Patient is currently alert, awake, no obvious acute distress.

HEENT:  Head:  The patient does have right temporal region mild hematoma without any bleeding.  Eyes:
  Pupils round, reactive to light.  No nystagmus.  ENT:  Oropharynx within normal limits.  Moist muco
us membranes.  No oral lesion, no pharyngeal erythema, no exudate.

NECK:  Supple, no JVD, no thyromegaly, no carotid bruit.

LUNGS:  Clear to auscultation without any rhonchi or rales.  No wheezing.  No accessory muscles of re
spiration in use.

CARDIAC:  S1, S2 regular without any murmur.

ABDOMEN:  Soft, bowel sounds present.  Mild discomfort noted in lower part.

BACK:  No CVA tenderness.

EXTREMITIES:  Upper extremity, passive movement of all joints are normal.  Lower extremity, no edema.
  Good peripheral pulsation.

MUSCULOSKELETAL:  The patient's knee examination and ankle examination on the right side is completel
y unremarkable.  Over there, range of motion is completely normal.

 

SIGNIFICANT LABORATORY DATA AND IMAGING:  EKG showing incomplete right bundle branch block pattern, p
rolonged QT interval.  CT brain negative for any acute intracranial process.  CBC:  WBC 6.7, hemoglob
in 13.7, platelet 189.  BMP shows sodium 139, potassium 3.0, chloride 103, carbon dioxide 27, anion g
ap 12, BUN 7, creatinine 0.72, glucose 93, calcium 10.4, magnesium 2.2.  LFT:  AST 69, ALT 35, alkali
ne phosphatase 82, albumin 4.2.  TSH 1.51.  CK 1673.  Urinalysis:  Leukocyte esterase moderate.  Preg
dar test negative.  Serum drug screen negative.  Urine drug screen negative.  Lithium level 1.60.

 

ASSESSMENT AND PLAN:

1.  Frequent fall at home.  Patient has temporal hematoma without any intracranial process, without a
ny focal neurological deficit or any sequelae clinically on examination.  The patient is currently as
ymptomatic.  The patient injured her ankle and knee on the right side and twisted, but without any cl
inical abnormality.  On examination, range of motion is completely normal.  This patient will need at
 least one time physical therapy evaluation to see her gait.

2.  Rhabdomyolysis, mild without any renal failure, most likely related with her fall and being in on
e particular position for a few minutes.  Patient is receiving IV fluid.  We will repeat total CK lev
el.

3.  Hypokalemia, replaced in the Emergency Room.  Patient will get IV fluid with potassium and will r
epeat BMP.  Her magnesium level is normal.

4.  Asymptomatic bacteriuria.  We will send urine culture.  The patient does not have any obvious inf
ection at this point.  For benefit of doubt, we will consider Cipro upon discharge for 5 days.

5.  Lithium level is little bit high and that is why we will hold on lithium therapy today.

6.  Anxiety and depression, bipolar disorder, and schizoaffective disorder.  The patient will continu
e all her depression medicine including risperidone 3 mg p.o. at bedtime, Zoloft 200 mg p.o. daily, t
razodone 300 mg p.o. at bedtime and Cymbalta 30 mg p.o. daily.

7.  Deep venous thrombosis prophylaxis not needed, because we are expecting discharge in 24 hours.

8.  Gastrointestinal prophylaxis, Pepcid 20 mg p.o. b.i.d.

 

CODE STATUS:  The patient is FULL CODE.  The patient's niece and the patient's sister is surrogate de
cision maker.

 

Disposition plan based on clinical course, likely 24 hours.  Plan of care discussed with the patient 
in detail.

## 2018-08-15 VITALS — TEMPERATURE: 98.7 F | SYSTOLIC BLOOD PRESSURE: 183 MMHG | DIASTOLIC BLOOD PRESSURE: 84 MMHG

## 2018-08-15 LAB
ALBUMIN SERPL BCG-MCNC: 3.7 G/DL (ref 3.5–5)
ALP SERPL-CCNC: 72 U/L (ref 40–150)
ALT SERPL W P-5'-P-CCNC: 28 U/L (ref 8–55)
ANION GAP SERPL CALC-SCNC: 10 MMOL/L (ref 10–20)
AST SERPL-CCNC: 31 U/L (ref 5–34)
BASOPHILS # BLD AUTO: 0 THOU/UL (ref 0–0.2)
BASOPHILS NFR BLD AUTO: 0.5 % (ref 0–1)
BILIRUB SERPL-MCNC: 0.2 MG/DL (ref 0.2–1.2)
BUN SERPL-MCNC: 16 MG/DL (ref 9.8–20.1)
CALCIUM SERPL-MCNC: 10.2 MG/DL (ref 7.8–10.44)
CHLORIDE SERPL-SCNC: 109 MMOL/L (ref 98–107)
CK SERPL-CCNC: 415 U/L (ref 29–168)
CO2 SERPL-SCNC: 25 MMOL/L (ref 22–29)
CREAT CL PREDICTED SERPL C-G-VRATE: 92 ML/MIN (ref 70–130)
EOSINOPHIL # BLD AUTO: 0.2 THOU/UL (ref 0–0.7)
EOSINOPHIL NFR BLD AUTO: 3.2 % (ref 0–10)
GLOBULIN SER CALC-MCNC: 1.9 G/DL (ref 2.4–3.5)
GLUCOSE SERPL-MCNC: 102 MG/DL (ref 70–105)
HGB BLD-MCNC: 12.4 G/DL (ref 12–16)
LYMPHOCYTES # BLD: 1.1 THOU/UL (ref 1.2–3.4)
LYMPHOCYTES NFR BLD AUTO: 18.7 % (ref 21–51)
MCH RBC QN AUTO: 31.4 PG (ref 27–31)
MCV RBC AUTO: 97 FL (ref 78–98)
MONOCYTES # BLD AUTO: 0.4 THOU/UL (ref 0.11–0.59)
MONOCYTES NFR BLD AUTO: 6.8 % (ref 0–10)
NEUTROPHILS # BLD AUTO: 4.1 THOU/UL (ref 1.4–6.5)
NEUTROPHILS NFR BLD AUTO: 70.9 % (ref 42–75)
PLATELET # BLD AUTO: 160 THOU/UL (ref 130–400)
POTASSIUM SERPL-SCNC: 5 MMOL/L (ref 3.5–5.1)
RBC # BLD AUTO: 3.97 MILL/UL (ref 4.2–5.4)
SODIUM SERPL-SCNC: 139 MMOL/L (ref 136–145)
WBC # BLD AUTO: 5.8 THOU/UL (ref 4.8–10.8)

## 2018-08-15 RX ADMIN — Medication SCH ML: at 09:50

## 2018-08-15 RX ADMIN — POTASSIUM CHLORIDE AND SODIUM CHLORIDE SCH MLS: 450; 150 INJECTION, SOLUTION INTRAVENOUS at 01:26

## 2018-08-15 RX ADMIN — POTASSIUM CHLORIDE AND SODIUM CHLORIDE SCH MLS: 450; 150 INJECTION, SOLUTION INTRAVENOUS at 09:50

## 2018-08-15 NOTE — PDOC.PN
- Subjective


Encounter Start Date: 08/15/18


Encounter Start Time: 07:45


-: old records requested/rev





Patient seen and examined. No new complaints. No overnight events





- Objective


Resuscitation Status: 


 











Resuscitation Status           FULL:Full Resuscitation














MAR Reviewed: Yes


Vital Signs & Weight: 


 Vital Signs (12 hours)











  Temp Pulse Resp BP BP Pulse Ox


 


 08/15/18 09:49   79   184/77 H  


 


 08/15/18 08:00  97.4 F L  76  27 H   183/84 H  90 L


 


 08/15/18 07:00  97.4 F L  76  27 H  183/84 H   90 L


 


 08/15/18 03:42  97.8 F  86  20   167/74 H 








 Weight











Admit Weight                   142 lb 6.4 oz


 


Weight                         143 lb 9.6 oz














I&O: 


 











 08/14/18 08/15/18 08/16/18





 06:59 06:59 06:59


 


Intake Total 1113 4450 


 


Output Total 400 1000 


 


Balance 713 3450 











Result Diagrams: 


 08/15/18 06:37





 08/15/18 06:37


EKG Reviewed by me: Yes (nsr)





Phys Exam





- Physical Examination


Constitutional: NAD


HEENT: PERRLA, moist MMs, sclera anicteric


Neck: no JVD, supple


Respiratory: no wheezing, no rales, no rhonchi


Cardiovascular: RRR, no significant murmur, no rub


Gastrointestinal: soft, non-tender, no distention, positive bowel sounds


Musculoskeletal: no edema, pulses present


Neurological: non-focal, normal sensation, moves all 4 limbs


Psychiatric: normal affect


Skin: no rash, normal turgor





Dx/Plan


(1) Hypokalemia


Code(s): E87.6 - HYPOKALEMIA   Status: Acute   





(2) Rhabdomyolysis


Code(s): M62.82 - RHABDOMYOLYSIS   Status: Acute   





(3) UTI (urinary tract infection)


Status: Acute   





(4) Bipolar disorder


Code(s): F31.9 - BIPOLAR DISORDER, UNSPECIFIED   Status: Chronic   





(5) Dyslipidemia


Code(s): E78.5 - HYPERLIPIDEMIA, UNSPECIFIED   Status: Chronic   





(6) Hypertension


Code(s): I10 - ESSENTIAL (PRIMARY) HYPERTENSION   Status: Chronic   





(7) Tobacco abuse


Code(s): Z72.0 - TOBACCO USE   Status: Chronic   





- Plan


cont current plan of care, continue antibiotics





* medication reviewed as below


* symptomatic treatment


* [facility]


* DC IVF


* macrobid on discharge








Review of Systems





- Review of Systems


Eyes: negative: Pain, Vision Change, Conjunctivae Inflammation, Eyelid 

Inflammation, Redness, Other


ENT: negative: Ear Pain, Ear Discharge, Nose Pain, Nose Discharge, Nose 

Congestion, Mouth Pain, Mouth Swelling, Throat Pain, Throat Swelling, Other


Respiratory: negative: Cough, Dry, Shortness of Breath, Hemoptysis, SOB with 

Excertion, Pleuritic Pain, Sputum, Wheezing


Cardiovascular: negative: chest pain, palpitations, orthopnea, paroxysmal 

nocturnal dyspnea, edema, light headedness, other


Gastrointestinal: negative: Nausea, Vomiting, Abdominal Pain, Diarrhea, 

Constipation, Melena, Hematochezia, Other


Genitourinary: negative: Dysuria, Frequency, Incontinence, Hematuria, Retention

, Other


Musculoskeletal: negative: Neck Pain, Shoulder Pain, Arm Pain, Back Pain, Hand 

Pain, Leg Pain, Foot Pain, Other


Skin: negative: Rash, Lesions, Sameer, Bruising, Other





- Medications/Allergies


Allergies/Adverse Reactions: 


 Allergies











Allergy/AdvReac Type Severity Reaction Status Date / Time


 


codeine Allergy   Verified 08/14/18 03:29


 


morphine Allergy   Verified 08/14/18 03:29


 


Penicillins Allergy   Verified 08/14/18 03:29











Medications: 


 Current Medications





Acetaminophen (Tylenol)  650 mg PO Q4H PRN


   PRN Reason: Headache/Fever or Pain


   Last Admin: 08/14/18 16:16 Dose:  650 mg


Al Hydroxide/Mg Hydroxide (Maalox)  30 ml PO Q6H PRN


   PRN Reason: Heartburn  or Indigestion


Artificial Tears (Tears Naturale)  0 drop EA EYE PRN PRN


   PRN Reason: Dry Eyes


Famotidine (Pepcid)  20 mg PO BID KAJAL


   Last Admin: 08/15/18 09:49 Dose:  20 mg


Guaifenesin (Robitussin Sf)  200 mg PO Q4H PRN


   PRN Reason: Cough


Hydralazine HCl (Apresoline)  10 mg SLOW IVP Q4H PRN


   PRN Reason: Systolic BP > 180


   Last Admin: 08/15/18 09:49 Dose:  10 mg


Ketorolac Tromethamine (Toradol)  15 mg IVP Q6H PRN


   PRN Reason: Pain


   Stop: 08/19/18 10:06


   Last Admin: 08/15/18 09:45 Dose:  15 mg


Loperamide HCl (Imodium)  2 mg PO PRN PRN


   PRN Reason: Diarrhea/Loose Stools


Loratadine (Claritin)  10 mg PO DAILYPRN PRN


   PRN Reason: Sinus Symptoms


Magnesium Hydroxide (Milk Of Magnesium)  30 ml PO DAILYPRN PRN


   PRN Reason: Constipation


Mineral Oil/White Petrolatum (Eucerin Cream)  0 gm TOP BIDPRN PRN


   PRN Reason: Dry Skin


Ondansetron HCl (Zofran Odt)  4 mg PO Q6H PRN


   PRN Reason: Nausea/Vomiting


Ondansetron HCl (Zofran)  4 mg IVP Q6H PRN


   PRN Reason: Nausea/Vomiting


Phenol (Chloraseptic Spray 180 Ml Bot)  0 ml PO PRN PRN


   PRN Reason: Sore Throat


Risperidone (Risperidone)  3 mg PO [facility]


   Last Admin: 08/14/18 21:16 Dose:  3 mg


Saccharomyces Boulardii (Florastor)  250 mg PO DAILY [facility]


   Last Admin: 08/15/18 09:49 Dose:  250 mg


Senna (Senokot)  2 tab PO HSPRN PRN


   PRN Reason: Constipation


Sertraline HCl (Zoloft)  200 mg PO DAILY [facility]


   Last Admin: 08/15/18 09:49 Dose:  200 mg


Sodium Chloride (Ocean Nasal Spray 0.65%)  0 ml EA NARE QIDPRN PRN


   PRN Reason: Nasal Congestion


Sodium Chloride (Flush - Normal Saline)  10 ml IVF Q12HR [facility]


   Last Admin: 08/15/18 09:50 Dose:  10 ml


Sodium Chloride (Flush - Normal Saline)  10 ml IVF PRN PRN


   PRN Reason: Saline Flush


Trazodone HCl (Desyrel)  300 mg PO [facility]


   Last Admin: 08/14/18 21:15 Dose:  300 mg


Zolpidem Tartrate (Ambien)  5 mg PO HSPRN PRN


   PRN Reason: Insomnia

## 2018-08-15 NOTE — DIS
DATE OF ADMISSION:  08/14/2018

 

DATE OF DISCHARGE:  08/15/2018

 

PRIMARY CARE PHYSICIAN:  Genevieve Moore PA-C

 

DISCHARGE DISPOSITION:  Psych facility.

 

PRIMARY DISCHARGE DIAGNOSES:

1.  Rhabdomyolysis, improved.

2.  Hypokalemia, corrected.

3.  Asymptomatic urinary tract infection.

 

SECONDARY DISCHARGE DIAGNOSES:  Bipolar disorder, anxiety and depression, hypertension, dyslipidemia,
 tobacco abuse disorder.

 

PRIMARY PROCEDURE/OPERATION:  None.

 

RADIOLOGICAL INVESTIGATION:  CT brain is normal.

 

SIGNIFICANT LABORATORY DATA:  WBC 5.8, hemoglobin 12.4, platelet 160.  Sodium 139, potassium 5.0, BUN
 16, creatinine 0.67, .  LFT normal.  TSH 1.10.  Urinalysis:  Leukocyte esterase moderate.  Uri
ne drug screen negative.  Lithium level 1.60.  Serum drug screen negative.  Urine culture negative.

 

DISCHARGE MEDICATIONS:  ProAir HFA 2 puffs q.6 hourly p.r.n., lithium carbonate 300 mg p.o. b.i.d., r
isperidone 3 mg p.o. at bedtime, Zoloft 200 mg p.o. daily, trazodone 300 mg p.o. at bedtime, Macrobid
 100 mg p.o. b.i.d. for 7 days.

 

CONTRAINDICATIONS:  None.

 

CODE STATUS:  FULL CODE.

 

INPATIENT CONSULTANTS:  [facility].

 

ALLERGIES:  CODEINE, MORPHINE, PENICILLIN.

 

DISCHARGE PLAN:  Post hospital, patient should go to inpatient psych facility for psych treatment.

 

HOSPITAL COURSE:  A 62-year-old female with above-mentioned medical problem who was admitted by me ye
sterday.  Please see my HPI for further details.  She was brought to the ER for psych evaluation.  Jose Miguel howard had routine blood test done which showed rhabdomyolysis.  At home, she was having 3 falls and as sh
e had minor bruits on her scalp without any intracranial process.  She had minor twisting on ankle an
d knee, but she did not have any local abnormality on physical examination and she was able to ambula
te without any significant pain.  The patient has intermittent suicidal ideation and this patient muse
s have psychotic thoughts and that is why the patient needs inpatient psychiatric facility.  Before vivian howard send her to psych facility, we corrected her medical problem.  Rhabdomyolysis is improved and her p
otassium is corrected.  She does have asymptomatic UTI and that is why she was given Rocephin while i
n hospital.  She was given Levaquin while in hospital.  On discharge, we are changing to Macrobid.  T
he patient was given IV fluid.  At this point, patient is medically cleared for discharge and [facility] wi
ll be consulted and [facility] needs to find psych facility for her.

 

The patient is seen and examined at bedside today.

 

REVIEW OF SYSTEMS:  Not reliable with this particular patient because of psychotic thoughts.

 

PHYSICAL EXAMINATION:

VITAL SIGNS:  Currently, temperature 97.4, pulse 76, respiratory rate 20, blood pressure 167/74, weig
ht 143 pounds.

GENERAL:  The patient is currently alert, awake, no obvious acute distress.

HEAD:  Normocephalic, atraumatic.

EYES:  Pupils round, reactive to light.  Extraocular muscle intact.

ENT:  Oropharynx within normal limits.  Moist mucous membranes, no oral lesion, no pharyngeal erythem
a, no exudate.

NECK:  Supple, no JVD, no thyromegaly, no carotid bruit.  No jugular venous distention.

LUNGS:  Clear to auscultation.

CARDIAC:  S1, S2 regular without any murmur.

NEUROLOGIC:  Nonfocal examination.

ABDOMEN:  Soft and benign without any suprapubic tenderness.

 

Blood pressure was high by the time of discharge.  I think it is related with ongoing IV fluid which 
we are stopping now and expected to improve blood pressure as well.

## 2018-08-16 ENCOUNTER — HOSPITAL ENCOUNTER (EMERGENCY)
Dept: HOSPITAL 57 - BURERS | Age: 63
Discharge: HOME | End: 2018-08-16
Payer: MEDICARE
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
## 2018-08-16 DIAGNOSIS — E11.9: ICD-10-CM

## 2018-08-16 DIAGNOSIS — M54.9: ICD-10-CM

## 2018-08-16 DIAGNOSIS — Z79.899: ICD-10-CM

## 2018-08-16 DIAGNOSIS — F31.9: ICD-10-CM

## 2018-08-16 DIAGNOSIS — S50.12XA: ICD-10-CM

## 2018-08-16 DIAGNOSIS — S00.03XA: Primary | ICD-10-CM

## 2018-08-16 DIAGNOSIS — F17.210: ICD-10-CM

## 2018-08-16 DIAGNOSIS — E78.5: ICD-10-CM

## 2018-08-16 DIAGNOSIS — I10: ICD-10-CM

## 2018-08-16 LAB
ALBUMIN SERPL BCG-MCNC: 4.2 G/DL (ref 3.5–5)
ALP SERPL-CCNC: 83 U/L (ref 40–150)
ALT SERPL W P-5'-P-CCNC: 35 U/L (ref 8–55)
ANION GAP SERPL CALC-SCNC: 10 MMOL/L (ref 10–20)
APAP SERPL-MCNC: (no result) MCG/ML (ref 10–30)
AST SERPL-CCNC: 38 U/L (ref 5–34)
BACTERIA UR QL AUTO: (no result) HPF
BASOPHILS # BLD AUTO: 0 THOU/UL (ref 0–0.2)
BASOPHILS NFR BLD AUTO: 0.6 % (ref 0–1)
BILIRUB SERPL-MCNC: 0.4 MG/DL (ref 0.2–1.2)
BUN SERPL-MCNC: 13 MG/DL (ref 9.8–20.1)
CALCIUM SERPL-MCNC: 10.6 MG/DL (ref 7.8–10.44)
CHLORIDE SERPL-SCNC: 107 MMOL/L (ref 98–107)
CK SERPL-CCNC: 451 U/L (ref 29–168)
CO2 SERPL-SCNC: 30 MMOL/L (ref 22–29)
CREAT CL PREDICTED SERPL C-G-VRATE: 0 ML/MIN (ref 70–130)
DRUG SCREEN CUTOFF: (no result)
EOSINOPHIL # BLD AUTO: 0.1 THOU/UL (ref 0–0.7)
EOSINOPHIL NFR BLD AUTO: 1.4 % (ref 0–10)
GLOBULIN SER CALC-MCNC: 2.1 G/DL (ref 2.4–3.5)
GLUCOSE SERPL-MCNC: 114 MG/DL (ref 70–105)
HGB BLD-MCNC: 13.7 G/DL (ref 12–16)
LYMPHOCYTES # BLD AUTO: 0.8 THOU/UL (ref 1.2–3.4)
LYMPHOCYTES NFR BLD AUTO: 10.3 % (ref 21–51)
MCH RBC QN AUTO: 29.1 PG (ref 27–31)
MCV RBC AUTO: 84.7 FL (ref 78–98)
MEDTOX CONTROL LINE VALID?: (no result)
MONOCYTES # BLD AUTO: 0.4 THOU/UL (ref 0.11–0.59)
MONOCYTES NFR BLD AUTO: 5.2 % (ref 0–10)
NEUTROPHILS # BLD AUTO: 6.1 THOU/UL (ref 1.4–6.5)
NEUTROPHILS NFR BLD AUTO: 82.4 % (ref 42–75)
OVAL FAT BODIES #/AREA URNS AUTO: (no result) HPF
PLATELET # BLD AUTO: 209 THOU/UL (ref 130–400)
POTASSIUM SERPL-SCNC: 4.1 MMOL/L (ref 3.5–5.1)
RBC # BLD AUTO: 4.7 MILL/UL (ref 4.2–5.4)
RBC UR QL AUTO: (no result) HPF (ref 0–3)
SALICYLATES SERPL-MCNC: (no result) MG/DL (ref 15–30)
SODIUM SERPL-SCNC: 143 MMOL/L (ref 136–145)
SP GR UR STRIP: 1.01 (ref 1–1.03)
WBC # BLD AUTO: 7.3 THOU/UL (ref 4.8–10.8)
WBC UR QL AUTO: (no result) HPF (ref 0–3)

## 2018-08-16 PROCEDURE — 80306 DRUG TEST PRSMV INSTRMNT: CPT

## 2018-08-16 PROCEDURE — 85025 COMPLETE CBC W/AUTO DIFF WBC: CPT

## 2018-08-16 PROCEDURE — 80307 DRUG TEST PRSMV CHEM ANLYZR: CPT

## 2018-08-16 PROCEDURE — 81015 MICROSCOPIC EXAM OF URINE: CPT

## 2018-08-16 PROCEDURE — 82550 ASSAY OF CK (CPK): CPT

## 2018-08-16 PROCEDURE — 80053 COMPREHEN METABOLIC PANEL: CPT

## 2018-08-16 PROCEDURE — 99284 EMERGENCY DEPT VISIT MOD MDM: CPT

## 2018-08-16 PROCEDURE — 81003 URINALYSIS AUTO W/O SCOPE: CPT

## 2018-08-16 PROCEDURE — 80178 ASSAY OF LITHIUM: CPT

## 2018-09-01 ENCOUNTER — HOSPITAL ENCOUNTER (OUTPATIENT)
Dept: HOSPITAL 92 - ERS | Age: 63
Setting detail: OBSERVATION
LOS: 1 days | Discharge: HOME | End: 2018-09-02
Attending: INTERNAL MEDICINE | Admitting: INTERNAL MEDICINE
Payer: MEDICARE

## 2018-09-01 ENCOUNTER — HOSPITAL ENCOUNTER (EMERGENCY)
Dept: HOSPITAL 92 - ERS | Age: 63
Discharge: HOME | End: 2018-09-01
Payer: MEDICARE

## 2018-09-01 VITALS — BODY MASS INDEX: 26.2 KG/M2

## 2018-09-01 DIAGNOSIS — Z88.5: ICD-10-CM

## 2018-09-01 DIAGNOSIS — F17.210: ICD-10-CM

## 2018-09-01 DIAGNOSIS — I10: ICD-10-CM

## 2018-09-01 DIAGNOSIS — F17.200: ICD-10-CM

## 2018-09-01 DIAGNOSIS — R11.0: Primary | ICD-10-CM

## 2018-09-01 DIAGNOSIS — E11.9: ICD-10-CM

## 2018-09-01 DIAGNOSIS — Z79.899: ICD-10-CM

## 2018-09-01 DIAGNOSIS — N39.0: Primary | ICD-10-CM

## 2018-09-01 DIAGNOSIS — F31.9: ICD-10-CM

## 2018-09-01 DIAGNOSIS — E78.5: ICD-10-CM

## 2018-09-01 DIAGNOSIS — N39.0: ICD-10-CM

## 2018-09-01 DIAGNOSIS — J45.901: ICD-10-CM

## 2018-09-01 DIAGNOSIS — Z79.4: ICD-10-CM

## 2018-09-01 DIAGNOSIS — J44.9: ICD-10-CM

## 2018-09-01 DIAGNOSIS — Z88.0: ICD-10-CM

## 2018-09-01 LAB
ALBUMIN SERPL BCG-MCNC: 4.4 G/DL (ref 3.5–5)
ALBUMIN SERPL BCG-MCNC: 4.6 G/DL (ref 3.5–5)
ALP SERPL-CCNC: 82 U/L (ref 40–150)
ALP SERPL-CCNC: 85 U/L (ref 40–150)
ALT SERPL W P-5'-P-CCNC: 13 U/L (ref 8–55)
ALT SERPL W P-5'-P-CCNC: 15 U/L (ref 8–55)
ANION GAP SERPL CALC-SCNC: 12 MMOL/L (ref 10–20)
ANION GAP SERPL CALC-SCNC: 13 MMOL/L (ref 10–20)
AST SERPL-CCNC: 13 U/L (ref 5–34)
AST SERPL-CCNC: 14 U/L (ref 5–34)
BASOPHILS # BLD AUTO: 0 THOU/UL (ref 0–0.2)
BASOPHILS # BLD AUTO: 0 THOU/UL (ref 0–0.2)
BASOPHILS NFR BLD AUTO: 0 % (ref 0–1)
BASOPHILS NFR BLD AUTO: 0.3 % (ref 0–1)
BILIRUB SERPL-MCNC: 0.3 MG/DL (ref 0.2–1.2)
BILIRUB SERPL-MCNC: 0.3 MG/DL (ref 0.2–1.2)
BUN SERPL-MCNC: 11 MG/DL (ref 9.8–20.1)
BUN SERPL-MCNC: 13 MG/DL (ref 9.8–20.1)
CALCIUM SERPL-MCNC: 10.8 MG/DL (ref 7.8–10.44)
CALCIUM SERPL-MCNC: 11.1 MG/DL (ref 7.8–10.44)
CHLORIDE SERPL-SCNC: 105 MMOL/L (ref 98–107)
CHLORIDE SERPL-SCNC: 107 MMOL/L (ref 98–107)
CO2 SERPL-SCNC: 24 MMOL/L (ref 22–29)
CO2 SERPL-SCNC: 25 MMOL/L (ref 22–29)
CREAT CL PREDICTED SERPL C-G-VRATE: 0 ML/MIN (ref 70–130)
CREAT CL PREDICTED SERPL C-G-VRATE: 0 ML/MIN (ref 70–130)
CRYSTAL-AUWI FLAG: 0 (ref 0–15)
CRYSTAL-AUWI FLAG: 0 (ref 0–15)
EOSINOPHIL # BLD AUTO: 0 THOU/UL (ref 0–0.7)
EOSINOPHIL # BLD AUTO: 0.1 THOU/UL (ref 0–0.7)
EOSINOPHIL NFR BLD AUTO: 0.3 % (ref 0–10)
EOSINOPHIL NFR BLD AUTO: 0.7 % (ref 0–10)
GLOBULIN SER CALC-MCNC: 2.4 G/DL (ref 2.4–3.5)
GLOBULIN SER CALC-MCNC: 2.6 G/DL (ref 2.4–3.5)
GLUCOSE SERPL-MCNC: 109 MG/DL (ref 70–105)
GLUCOSE SERPL-MCNC: 98 MG/DL (ref 70–105)
HEV IGM SER QL: 1.4 (ref 0–7.99)
HEV IGM SER QL: 4.9 (ref 0–7.99)
HGB BLD-MCNC: 14 G/DL (ref 12–16)
HGB BLD-MCNC: 14 G/DL (ref 12–16)
HYALINE CASTS #/AREA URNS LPF: (no result) LPF
HYALINE CASTS #/AREA URNS LPF: (no result) LPF
LIPASE SERPL-CCNC: 21 U/L (ref 8–78)
LIPASE SERPL-CCNC: 25 U/L (ref 8–78)
LYMPHOCYTES # BLD: 0.7 THOU/UL (ref 1.2–3.4)
LYMPHOCYTES # BLD: 1.3 THOU/UL (ref 1.2–3.4)
LYMPHOCYTES NFR BLD AUTO: 13 % (ref 21–51)
LYMPHOCYTES NFR BLD AUTO: 9.8 % (ref 21–51)
MAGNESIUM SERPL-MCNC: 2.4 MG/DL (ref 1.6–2.6)
MCH RBC QN AUTO: 32 PG (ref 27–31)
MCH RBC QN AUTO: 32.1 PG (ref 27–31)
MCV RBC AUTO: 93.5 FL (ref 78–98)
MCV RBC AUTO: 94.7 FL (ref 78–98)
MONOCYTES # BLD AUTO: 0.5 THOU/UL (ref 0.11–0.59)
MONOCYTES # BLD AUTO: 0.5 THOU/UL (ref 0.11–0.59)
MONOCYTES NFR BLD AUTO: 5.3 % (ref 0–10)
MONOCYTES NFR BLD AUTO: 6.7 % (ref 0–10)
NEUTROPHILS # BLD AUTO: 6.1 THOU/UL (ref 1.4–6.5)
NEUTROPHILS # BLD AUTO: 8.2 THOU/UL (ref 1.4–6.5)
NEUTROPHILS NFR BLD AUTO: 80.7 % (ref 42–75)
NEUTROPHILS NFR BLD AUTO: 83.2 % (ref 42–75)
PATHC CAST-AUWI FLAG: 0 (ref 0–2.49)
PATHC CAST-AUWI FLAG: 0.29 (ref 0–2.49)
PLATELET # BLD AUTO: 222 THOU/UL (ref 130–400)
PLATELET # BLD AUTO: 224 THOU/UL (ref 130–400)
POTASSIUM SERPL-SCNC: 4.2 MMOL/L (ref 3.5–5.1)
POTASSIUM SERPL-SCNC: 4.3 MMOL/L (ref 3.5–5.1)
PROT UR STRIP.AUTO-MCNC: 30 MG/DL
RBC # BLD AUTO: 4.36 MILL/UL (ref 4.2–5.4)
RBC # BLD AUTO: 4.36 MILL/UL (ref 4.2–5.4)
RBC UR QL AUTO: (no result) HPF (ref 0–3)
RBC UR QL AUTO: (no result) HPF (ref 0–3)
SODIUM SERPL-SCNC: 138 MMOL/L (ref 136–145)
SODIUM SERPL-SCNC: 140 MMOL/L (ref 136–145)
SP GR UR STRIP: 1 (ref 1–1.04)
SP GR UR STRIP: 1.01 (ref 1–1.04)
SPERM-AUWI FLAG: 0 (ref 0–9.9)
SPERM-AUWI FLAG: 0 (ref 0–9.9)
TROPONIN I SERPL DL<=0.01 NG/ML-MCNC: (no result) NG/ML (ref ?–0.03)
WBC # BLD AUTO: 10.2 THOU/UL (ref 4.8–10.8)
WBC # BLD AUTO: 7.4 THOU/UL (ref 4.8–10.8)
WBC UR QL AUTO: (no result) HPF (ref 0–3)
WBC UR QL AUTO: (no result) HPF (ref 0–3)
YEAST-AUWI FLAG: 0 (ref 0–25)
YEAST-AUWI FLAG: 0 (ref 0–25)

## 2018-09-01 PROCEDURE — 85379 FIBRIN DEGRADATION QUANT: CPT

## 2018-09-01 PROCEDURE — 36415 COLL VENOUS BLD VENIPUNCTURE: CPT

## 2018-09-01 PROCEDURE — 96365 THER/PROPH/DIAG IV INF INIT: CPT

## 2018-09-01 PROCEDURE — 72040 X-RAY EXAM NECK SPINE 2-3 VW: CPT

## 2018-09-01 PROCEDURE — 94640 AIRWAY INHALATION TREATMENT: CPT

## 2018-09-01 PROCEDURE — 96375 TX/PRO/DX INJ NEW DRUG ADDON: CPT

## 2018-09-01 PROCEDURE — 83605 ASSAY OF LACTIC ACID: CPT

## 2018-09-01 PROCEDURE — 74176 CT ABD & PELVIS W/O CONTRAST: CPT

## 2018-09-01 PROCEDURE — 81003 URINALYSIS AUTO W/O SCOPE: CPT

## 2018-09-01 PROCEDURE — 36416 COLLJ CAPILLARY BLOOD SPEC: CPT

## 2018-09-01 PROCEDURE — 71046 X-RAY EXAM CHEST 2 VIEWS: CPT

## 2018-09-01 PROCEDURE — 96374 THER/PROPH/DIAG INJ IV PUSH: CPT

## 2018-09-01 PROCEDURE — 83690 ASSAY OF LIPASE: CPT

## 2018-09-01 PROCEDURE — 87086 URINE CULTURE/COLONY COUNT: CPT

## 2018-09-01 PROCEDURE — 85025 COMPLETE CBC W/AUTO DIFF WBC: CPT

## 2018-09-01 PROCEDURE — 80053 COMPREHEN METABOLIC PANEL: CPT

## 2018-09-01 PROCEDURE — 84484 ASSAY OF TROPONIN QUANT: CPT

## 2018-09-01 PROCEDURE — 81015 MICROSCOPIC EXAM OF URINE: CPT

## 2018-09-01 PROCEDURE — 83735 ASSAY OF MAGNESIUM: CPT

## 2018-09-01 PROCEDURE — 99285 EMERGENCY DEPT VISIT HI MDM: CPT

## 2018-09-01 PROCEDURE — 93005 ELECTROCARDIOGRAM TRACING: CPT

## 2018-09-01 PROCEDURE — 71045 X-RAY EXAM CHEST 1 VIEW: CPT

## 2018-09-01 PROCEDURE — 82962 GLUCOSE BLOOD TEST: CPT

## 2018-09-01 PROCEDURE — 99406 BEHAV CHNG SMOKING 3-10 MIN: CPT

## 2018-09-01 PROCEDURE — 87040 BLOOD CULTURE FOR BACTERIA: CPT

## 2018-09-01 PROCEDURE — G0378 HOSPITAL OBSERVATION PER HR: HCPCS

## 2018-09-01 PROCEDURE — 96361 HYDRATE IV INFUSION ADD-ON: CPT

## 2018-09-01 NOTE — RAD
AP VIEW OF THE CHEST:

9/1/18

 

INDICATION:

Cough.

 

COMPARISON:  

Prior exam dated 4/11/18.

 

IMPRESSION:  

There is stable chronic lung changes. Calcified granuloma within the left lower lobe is stable. Heart
 size is normal appearing. No acute osseous abnormalities noted 

 

POS: [facility]

## 2018-09-01 NOTE — CT
PRELIMINARY REPORT/[facility]/EMERGENTY AFTER-HOURS PROCEDURE 

 

CT Abdomen and Pelvis Without Intravenous Contrast

 

EXAM DATE/TIME:

9/1/2018 1:28 AM

 

CLINICAL HISTORY:

62 years old, female; Pain; Abdominal pain; Generalized; Patient HX: Abd pain, dx with "kidney stones
 the size of half dollars" PT singing amazing power in bed, PT rates her pain as a 25/10. Manic bipol
ar HX. PT states that she was dc from [facility] today per patient

 

TECHNIQUE:

Axial computed tomography images of the abdomen and pelvis without intravenous contrast.

Coronal reformatted images were created and reviewed.

 

COMPARISON:

No relevant prior studies available.

 

FINDINGS:

Lower thorax: Calcified granuloma within the lingula.

 

ABDOMEN:

Liver: Normal. No mass.

Gallbladder and bile ducts: Gallbladder not identified - surgical clips present in fossa.

Pancreas: Normal. No ductal dilation.

Spleen: Normal. No splenomegaly.

Adrenals: Normal. No mass.

Kidneys and ureters: Rounded 1 mm stones bilateral kidneys. No evidence of stones within the ureters 
or bladder.

Stomach and bowel: Mild - moderate amount retained stool material throughout nondilated colon.

Appendix: Sutures and surgical clips along the cecum- suspect appendectomy.

 

PELVIS:

Bladder: See Kidneys And Ureters Finding.

Reproductive: Uterus is not identified.

 

ABDOMEN and PELVIS:

Intraperitoneal space: Small surgical clip by stomach.

Bones/joints: Chronic degenerative changes of the lumbar spine.

Soft tissues: Unremarkable.

Vasculature: Chronic atherosclerotic calcification of the vasculature.

Lymph nodes: Normal. No enlarged lymph nodes.

 

IMPRESSION:

1. Bilateral renal nonobstructive stones. No evidence of obstructive uropathy or obstructive nephropa
thy.

2. Findings suggest mild-moderate degree of constipation.

 

Thank you for allowing us to participate in the care of your patient.

Dictated and Authenticated by: Hardy Griggs MD

09/01/2018 2:01 AM Central Time ([place] & [place])

 

 

FINAL REPORT 

CT STONE PROTOCOL:

 

Date:  09/01/18 

 

IMPRESSION: 

I agree with the preliminary report provided by Alesha. 

1.  There are tiny punctate nonobstructing stones within the kidneys. No ureteral calculus or hydrone
phrosis is evident. 

2.  There is nodular contour of the liver, suspicious for mild cirrhosis. 

3.  Spleen is enlarged, measuring 14.7 cm. 

4.  Cholecystectomy. 

5.  Mild amount of retained stool within the colon. 

6.  Other chronic findings as above. 

 

 

POS: [facility]

## 2018-09-01 NOTE — RAD
PA AND LATERAL CHEST:

 

Date:  09/01/18 

 

INDICATION:

Cough with abdominal pain. 

 

IMPRESSION: 

There is a calcified granuloma within the left lower lobe which is stable to a comparison from 2016. 
The lungs are mildly hyperinflated, but clear. Heart size is normal. No acute osseous abnormality is 
evident. No definite acute cardiopulmonary abnormality is evident. 

 

 

POS: [facility]

## 2018-09-02 VITALS — DIASTOLIC BLOOD PRESSURE: 73 MMHG | TEMPERATURE: 97.3 F | SYSTOLIC BLOOD PRESSURE: 134 MMHG

## 2018-09-02 RX ADMIN — OXYCODONE HYDROCHLORIDE AND ACETAMINOPHEN PRN TAB: 5; 325 TABLET ORAL at 09:31

## 2018-09-02 RX ADMIN — OXYCODONE HYDROCHLORIDE AND ACETAMINOPHEN PRN TAB: 5; 325 TABLET ORAL at 16:00

## 2018-09-02 NOTE — RAD
CERVICAL SPINE SERIES 3 VIEWS:

 

Date:  09/01/18 

 

HISTORY:  

Neck injury. 

 

FINDINGS:

Vertebral bodies are normal in height. The C7 vertebral body is not well visualized on this study. Th
ere is an old appearing spinous process injury of C7. There is no soft tissue swelling. Facets are in
 normal alignment. 

 

IMPRESSION: 

No evidence of fracture. C7 is not well visualized on this exam. 

 

 

POS: [facility]

## 2018-09-02 NOTE — RAD
PA AND LATERAL CHEST:

 

Date:  09/01/18 

 

COMPARISON:  

Prior day's study. 

 

HISTORY:  

Chest pain. Cough. 

 

FINDINGS:

Heart size and mediastinum are within normal limits. Lungs are clear of any infiltrative process. No 
rib fractures are identified. No pleural effusions. There are arthritic changes of the spine. 

 

IMPRESSION: 

No active intrathoracic disease. 

 

 

POS: [facility]

## 2018-09-02 NOTE — HP
CHIEF COMPLAINT:  Nausea.

 

HISTORY OF PRESENT ILLNESS:  This is a 62-year-old female with a known history of bipolar disorder wh
o was recently discharged from [facility] approximately 2 days prior to admiss
ion at our facility.  The patient tells me that she has multiple issues that are going on with her th
at needed to be managed in our facility, namely that she has urinary tract infection, pneumonia, and 
had fallen with a lot of musculoskeletal pain that was not evaluated while she was at the psychiatric
 hospital.  The patient also initially complained in the emergency department of nausea; however, sub
sequently also complained of hunger and was able to tolerate 3 boxed meals without difficulty, vomiti
ng or continued nausea.

 

At the time of my evaluation, the patient tells me the above and also informs me that she will have h
er cigarette that she has everyday.

 

REVIEW OF SYSTEMS:  As per HPI.  Constitutional:  The patient endorses fevers and chills for the last
 2 weeks and tells me that she had a temperature of 106 two days ago.  Cardiovascular:  No overt ches
t pain or pressure.  No palpitations.  Respiratory:  The patient tells me that she has been "having t
rouble breathing" due to her pneumonia.  Gastrointestinal:  Endorses nausea, although has not been ob
served to have any.  Denies any abdominal pain, diarrhea or constipation.  Genitourinary:  Denies any
 dysuria.  Musculoskeletal:  Complains of pain in various places that are not consistent.

 

Please note that the patient is overall a very, very poor historian and I doubt the accuracy of revie
w of systems as described above.

 

PAST MEDICAL HISTORY:  As per HPI.

1.  Bipolar disorder.

2.  Diet controlled diabetes.

3.  Hypertension.

4.  Hyperlipidemia.

5.  Chronic obstructive pulmonary disease.

6.  Status post appendectomy.

7.  Status post tonsillectomy

8.  Status post cholecystectomy.

9.  Status post hysterectomy.

10.  Status post skin cancer removal several times.

10.  Status post surgery for a "fractured neck."

 

HOME MEDICATIONS:  The patient's home list currently includes the following:  Trazodone 200 mg p.o. a
t bedtime, risperidone 4 mg p.o. daily, prednisone 20 mg p.o. daily, hydroxyzine 100 mg p.o. t.i.d., 
sertraline 200 mg p.o. daily, Lithium carbonate 300 mg p.o. q.a.m.

 

SOCIAL HISTORY:  Active pack tobacco use.  Denies any alcohol use.  Denies any illicit drug use.  Hansel
arently, she has a daughter in [place] who is her active medical power of .

 

PHYSICAL EXAMINATION:

GENERAL:  The patient is awake, alert, conversant, in no acute distress, seated in the hospital bed, 
states that she feels "cold".

HEENT:  Normocephalic, atraumatic.  Equal ocular motions are intact, moist mucous membranes.

CARDIOVASCULAR:  S1, S2.  No murmurs, rubs or gallops.  Pulses 2+ bilateral upper extremities, no pit
ting pedal edema.

RESPIRATORY:  Reasonable air movement.  No conversational dyspnea.  No wheezes, rales or rhonchi.

ABDOMEN:  Positive bowel sounds, soft, nontender to palpation.

MUSCULOSKELETAL:  Moving all 4 extremities equally.  Able to ambulate with a walker.  No assistance o
therwise.

 

LABORATORY DATA AND IMAGING:  WBC 7.4, hemoglobin 14.0, hematocrit 41.3, platelets 224.  Sodium 138, 
potassium 4.2, chloride 105, bicarbonate 25, BUN 13, creatinine 0.77, glucose 98.  Lactic acid 0.8, c
alcium 11.1, magnesium 2.4, total bilirubin 0.3, AST 13, ALT 13, alkaline phosphatase 82.  Troponin l
ess than 0.01.  Total protein 7.0, albumin 4.4, lipase 21.  UA is significant for 30 of protein, mode
rate leukoesterase, 21-50 WBCs without any urine bacteria seen.

 

ASSESSMENT AND PLAN:  This is a 62-year-old female complaining of nausea and other multiple complaint
s.

1.  Nausea appears to have self resolved as the patient is tolerating an oral diet.

2.  Recent history supposedly of pneumonias.  I suspect more likely that the patient had chronic obst
ructive pulmonary disease exacerbation.  We will continue her prednisone in a taper fashion.  The pat
peter is currently satting well on room air, does not appear to have an acute exacerbation at this poi
nt in time.  We will obtain a chest x-ray.

3.  Urinary tract infection with a UA that is not particularly remarkable.  Urine culture is pending.
  Empiric Levaquin has been given.

4.  Bipolar disorder.  I suspect this is likely the largest active issue for the patient.  The patien
t tells me that her medications have been recently adjusted and that her bipolar disease is "in remis
tan".

5.  Active tobacco use.  I have reviewed with the patient that it is not advised for her to continue 
any tobacco usage while she has chronic obstructive pulmonary disease, particularly she is currently 
hospitalized.

6.  Musculoskeletal pain.  We will go ahead and image.  The patient does have complaints of neck and 
shoulder pain status post a fall for which she is unable to give further details.  We will also utili
ze anti-inflammatories, tramadol and lidocaine patch as needed to help with pain control.  Of note, t
he patient appears to be ambulating without significant difficulty using her walker, which appears to
 be her baseline.

7.  Activity as tolerated.

8.  Deep venous thrombosis prophylaxis with enoxaparin.

## 2018-09-03 ENCOUNTER — HOSPITAL ENCOUNTER (OUTPATIENT)
Dept: HOSPITAL 92 - ERS | Age: 63
Setting detail: OBSERVATION
LOS: 3 days | Discharge: HOME | End: 2018-09-06
Attending: INTERNAL MEDICINE | Admitting: INTERNAL MEDICINE
Payer: MEDICARE

## 2018-09-03 VITALS — BODY MASS INDEX: 25.7 KG/M2

## 2018-09-03 DIAGNOSIS — Z79.899: ICD-10-CM

## 2018-09-03 DIAGNOSIS — R55: ICD-10-CM

## 2018-09-03 DIAGNOSIS — G89.29: ICD-10-CM

## 2018-09-03 DIAGNOSIS — E78.5: ICD-10-CM

## 2018-09-03 DIAGNOSIS — M54.9: ICD-10-CM

## 2018-09-03 DIAGNOSIS — I49.8: Primary | ICD-10-CM

## 2018-09-03 DIAGNOSIS — F31.9: ICD-10-CM

## 2018-09-03 DIAGNOSIS — F17.210: ICD-10-CM

## 2018-09-03 LAB
ALBUMIN SERPL BCG-MCNC: 4.2 G/DL (ref 3.5–5)
ALP SERPL-CCNC: 82 U/L (ref 40–150)
ALT SERPL W P-5'-P-CCNC: 13 U/L (ref 8–55)
ANION GAP SERPL CALC-SCNC: 14 MMOL/L (ref 10–20)
AST SERPL-CCNC: 15 U/L (ref 5–34)
BASOPHILS # BLD AUTO: 0 THOU/UL (ref 0–0.2)
BASOPHILS NFR BLD AUTO: 0.3 % (ref 0–1)
BILIRUB SERPL-MCNC: 0.2 MG/DL (ref 0.2–1.2)
BUN SERPL-MCNC: 15 MG/DL (ref 9.8–20.1)
CALCIUM SERPL-MCNC: 10.2 MG/DL (ref 7.8–10.44)
CHLORIDE SERPL-SCNC: 104 MMOL/L (ref 98–107)
CK MB SERPL-MCNC: 4.6 NG/ML (ref 0–6.6)
CO2 SERPL-SCNC: 23 MMOL/L (ref 22–29)
CREAT CL PREDICTED SERPL C-G-VRATE: 0 ML/MIN (ref 70–130)
CRYSTAL-AUWI FLAG: 0 (ref 0–15)
DRUG SCREEN CUTOFF: (no result)
EOSINOPHIL # BLD AUTO: 0 THOU/UL (ref 0–0.7)
EOSINOPHIL NFR BLD AUTO: 0.1 % (ref 0–10)
GLOBULIN SER CALC-MCNC: 2.4 G/DL (ref 2.4–3.5)
GLUCOSE SERPL-MCNC: 139 MG/DL (ref 70–105)
HEV IGM SER QL: 1.8 (ref 0–7.99)
HGB BLD-MCNC: 13.3 G/DL (ref 12–16)
HYALINE CASTS #/AREA URNS LPF: (no result) LPF
LYMPHOCYTES # BLD: 0.5 THOU/UL (ref 1.2–3.4)
LYMPHOCYTES NFR BLD AUTO: 5.1 % (ref 21–51)
MCH RBC QN AUTO: 32 PG (ref 27–31)
MCV RBC AUTO: 93.2 FL (ref 78–98)
MEDTOX CONTROL LINE VALID?: (no result)
MEDTOX READER #: (no result)
MONOCYTES # BLD AUTO: 0.5 THOU/UL (ref 0.11–0.59)
MONOCYTES NFR BLD AUTO: 4.5 % (ref 0–10)
NEUTROPHILS # BLD AUTO: 9.5 THOU/UL (ref 1.4–6.5)
NEUTROPHILS NFR BLD AUTO: 90 % (ref 42–75)
PATHC CAST-AUWI FLAG: 0 (ref 0–2.49)
PLATELET # BLD AUTO: 184 THOU/UL (ref 130–400)
POTASSIUM SERPL-SCNC: 3.7 MMOL/L (ref 3.5–5.1)
RBC # BLD AUTO: 4.15 MILL/UL (ref 4.2–5.4)
RBC UR QL AUTO: (no result) HPF (ref 0–3)
SODIUM SERPL-SCNC: 137 MMOL/L (ref 136–145)
SP GR UR STRIP: 1 (ref 1–1.04)
SPERM-AUWI FLAG: 0 (ref 0–9.9)
TROPONIN I SERPL DL<=0.01 NG/ML-MCNC: (no result) NG/ML (ref ?–0.03)
WBC # BLD AUTO: 10.6 THOU/UL (ref 4.8–10.8)
WBC UR QL AUTO: (no result) HPF (ref 0–3)
YEAST-AUWI FLAG: 0 (ref 0–25)

## 2018-09-03 PROCEDURE — 80048 BASIC METABOLIC PNL TOTAL CA: CPT

## 2018-09-03 PROCEDURE — 87040 BLOOD CULTURE FOR BACTERIA: CPT

## 2018-09-03 PROCEDURE — 97116 GAIT TRAINING THERAPY: CPT

## 2018-09-03 PROCEDURE — 85379 FIBRIN DEGRADATION QUANT: CPT

## 2018-09-03 PROCEDURE — 85025 COMPLETE CBC W/AUTO DIFF WBC: CPT

## 2018-09-03 PROCEDURE — 99406 BEHAV CHNG SMOKING 3-10 MIN: CPT

## 2018-09-03 PROCEDURE — 96361 HYDRATE IV INFUSION ADD-ON: CPT

## 2018-09-03 PROCEDURE — 81015 MICROSCOPIC EXAM OF URINE: CPT

## 2018-09-03 PROCEDURE — 93306 TTE W/DOPPLER COMPLETE: CPT

## 2018-09-03 PROCEDURE — 96366 THER/PROPH/DIAG IV INF ADDON: CPT

## 2018-09-03 PROCEDURE — 93005 ELECTROCARDIOGRAM TRACING: CPT

## 2018-09-03 PROCEDURE — 70450 CT HEAD/BRAIN W/O DYE: CPT

## 2018-09-03 PROCEDURE — 83036 HEMOGLOBIN GLYCOSYLATED A1C: CPT

## 2018-09-03 PROCEDURE — 97139 UNLISTED THERAPEUTIC PX: CPT

## 2018-09-03 PROCEDURE — 83605 ASSAY OF LACTIC ACID: CPT

## 2018-09-03 PROCEDURE — 36415 COLL VENOUS BLD VENIPUNCTURE: CPT

## 2018-09-03 PROCEDURE — G0378 HOSPITAL OBSERVATION PER HR: HCPCS

## 2018-09-03 PROCEDURE — 82553 CREATINE MB FRACTION: CPT

## 2018-09-03 PROCEDURE — 84484 ASSAY OF TROPONIN QUANT: CPT

## 2018-09-03 PROCEDURE — 82962 GLUCOSE BLOOD TEST: CPT

## 2018-09-03 PROCEDURE — 36416 COLLJ CAPILLARY BLOOD SPEC: CPT

## 2018-09-03 PROCEDURE — 81003 URINALYSIS AUTO W/O SCOPE: CPT

## 2018-09-03 PROCEDURE — 96365 THER/PROPH/DIAG IV INF INIT: CPT

## 2018-09-03 PROCEDURE — 72125 CT NECK SPINE W/O DYE: CPT

## 2018-09-03 PROCEDURE — 71045 X-RAY EXAM CHEST 1 VIEW: CPT

## 2018-09-03 PROCEDURE — 83735 ASSAY OF MAGNESIUM: CPT

## 2018-09-03 PROCEDURE — 99285 EMERGENCY DEPT VISIT HI MDM: CPT

## 2018-09-03 PROCEDURE — 80306 DRUG TEST PRSMV INSTRMNT: CPT

## 2018-09-03 PROCEDURE — 80053 COMPREHEN METABOLIC PANEL: CPT

## 2018-09-03 RX ADMIN — HYDROCODONE BITARTRATE AND ACETAMINOPHEN PRN TAB: 5; 325 TABLET ORAL at 20:04

## 2018-09-03 NOTE — CT
CT BRAIN NONCONTRAST:

 

HISTORY:

A 62-year-old female with acute head trauma from fall.

 

FINDINGS:

There is no midline shift or any other mass effect.  There is no evidence of acute intracranial hemor
rhage, large cortical infarct, obstructive hydrocephalus, or extraaxial fluid collection.  The calvar
ium is intact.

 

IMPRESSION: 

No acute intracranial findings.

 

jn []

 

POS: [facility]

## 2018-09-03 NOTE — RAD
AP VIEW OF THE CHEST:

 

INDICATION: 

History of cough.

 

COMPARISON: 

Prior exam dated 9/1/18.

 

FINDINGS: 

Chronic lung changes are stable.  Areas of subsegmental volume loss are now present within the right 
lung base.  Calcified granuloma is seen within the left lower lobe.  No acute osseous abnormality is 
evident.

 

IMPRESSION: 

Areas of subsequently atelectasis right lower lobe.  The remainder of the examination is unchanged fr
om the prior.

 

POS: [facility]

## 2018-09-03 NOTE — CT
CT CERVICAL SPINE NONCONTRAST:

 

HISTORY:

A 62-year-old female status post acute cervical trauma from fall.

 

FINDINGS:

There are no jumped or perched facets.  There is no evidence of acute fracture.  The vertebral body h
eights are maintained.  There is no prevertebral soft tissue swelling.

 

IMPRESSION:

No evidence of acute fracture or acute traumatic subluxation.

 

chucky []

 

POS: [facility]

## 2018-09-04 LAB
ANION GAP SERPL CALC-SCNC: 12 MMOL/L (ref 10–20)
BASOPHILS # BLD AUTO: 0 THOU/UL (ref 0–0.2)
BASOPHILS NFR BLD AUTO: 0.2 % (ref 0–1)
BUN SERPL-MCNC: 18 MG/DL (ref 9.8–20.1)
CALCIUM SERPL-MCNC: 10.4 MG/DL (ref 7.8–10.44)
CHLORIDE SERPL-SCNC: 109 MMOL/L (ref 98–107)
CK MB SERPL-MCNC: 1.9 NG/ML (ref 0–6.6)
CO2 SERPL-SCNC: 27 MMOL/L (ref 22–29)
CREAT CL PREDICTED SERPL C-G-VRATE: 78 ML/MIN (ref 70–130)
EOSINOPHIL # BLD AUTO: 0 THOU/UL (ref 0–0.7)
EOSINOPHIL NFR BLD AUTO: 0.4 % (ref 0–10)
GLUCOSE SERPL-MCNC: 87 MG/DL (ref 70–105)
HGB BLD-MCNC: 12.7 G/DL (ref 12–16)
LYMPHOCYTES # BLD: 0.9 THOU/UL (ref 1.2–3.4)
LYMPHOCYTES NFR BLD AUTO: 11.3 % (ref 21–51)
MAGNESIUM SERPL-MCNC: 2.3 MG/DL (ref 1.6–2.6)
MCH RBC QN AUTO: 31.3 PG (ref 27–31)
MCV RBC AUTO: 94.9 FL (ref 78–98)
MONOCYTES # BLD AUTO: 0.7 THOU/UL (ref 0.11–0.59)
MONOCYTES NFR BLD AUTO: 9.1 % (ref 0–10)
NEUTROPHILS # BLD AUTO: 6.5 THOU/UL (ref 1.4–6.5)
NEUTROPHILS NFR BLD AUTO: 79.1 % (ref 42–75)
PLATELET # BLD AUTO: 204 THOU/UL (ref 130–400)
POTASSIUM SERPL-SCNC: 4.2 MMOL/L (ref 3.5–5.1)
RBC # BLD AUTO: 4.06 MILL/UL (ref 4.2–5.4)
SODIUM SERPL-SCNC: 144 MMOL/L (ref 136–145)
TROPONIN I SERPL DL<=0.01 NG/ML-MCNC: (no result) NG/ML (ref ?–0.03)
WBC # BLD AUTO: 8.2 THOU/UL (ref 4.8–10.8)

## 2018-09-04 RX ADMIN — HYDROCODONE BITARTRATE AND ACETAMINOPHEN PRN TAB: 5; 325 TABLET ORAL at 04:45

## 2018-09-04 RX ADMIN — HYDROCODONE BITARTRATE AND ACETAMINOPHEN PRN TAB: 5; 325 TABLET ORAL at 14:06

## 2018-09-04 RX ADMIN — HYDROCODONE BITARTRATE AND ACETAMINOPHEN PRN TAB: 5; 325 TABLET ORAL at 20:14

## 2018-09-05 RX ADMIN — HYDROCODONE BITARTRATE AND ACETAMINOPHEN PRN TAB: 5; 325 TABLET ORAL at 09:04

## 2018-09-05 RX ADMIN — HYDROCODONE BITARTRATE AND ACETAMINOPHEN PRN TAB: 5; 325 TABLET ORAL at 20:59

## 2018-09-05 RX ADMIN — HYDROCODONE BITARTRATE AND ACETAMINOPHEN PRN TAB: 5; 325 TABLET ORAL at 15:20

## 2018-09-05 RX ADMIN — HYDROCODONE BITARTRATE AND ACETAMINOPHEN PRN TAB: 5; 325 TABLET ORAL at 00:09

## 2018-09-05 NOTE — PDOC.PN
- Subjective


Encounter Start Date: 09/04/18


Encounter Start Time: 15:00





no change overnigh,t pt probalby at baseline.





Cardiolgy eval pending, had a 3 sec pause overnight while sleeping.





No F/C, no n/V/d/c, no syncope or presyncope





Pt walking downstairs repeatedly





- Objective


Resuscitation Status: 


 











Resuscitation Status           FULL:Full Resuscitation














MAR Reviewed: Yes


Vital Signs & Weight: 


 Vital Signs (12 hours)











  Temp Pulse Resp BP BP Pulse Ox


 


 09/05/18 10:50  98.8 F  84  20   127/60  92 L


 


 09/05/18 08:00  97.6 F  94  16   


 


 09/05/18 07:38  98.5 F  92  20   123/59 L  96


 


 09/05/18 04:00  97.6 F  94  16  147/67 H   93 L








 Weight











Weight                         140 lb 6.4 oz














I&O: 


 











 09/04/18 09/05/18 09/06/18





 06:59 06:59 06:59


 


Intake Total 680 1330 


 


Output Total 500 300 


 


Balance 180 1030 











Result Diagrams: 


 09/04/18 03:30





 09/04/18 03:30


Additional Labs: 


 Accuchecks











  09/05/18 09/05/18 09/04/18





  11:26 06:07 20:15


 


POC Glucose  131 H  100  125 H














  09/04/18





  16:18


 


POC Glucose  121 H














Phys Exam





- Physical Examination


Constitutional: NAD


HEENT: PERRLA, moist MMs, sclera anicteric, oral pharynx no lesions


Neck: no nodes, no JVD, supple, full ROM


Respiratory: no wheezing, no rales, no rhonchi, clear to auscultation bilateral


Cardiovascular: RRR, no significant murmur, no rub


Gastrointestinal: soft, non-tender, no distention, positive bowel sounds


Musculoskeletal: edema present


Neurological: non-focal, normal sensation, moves all 4 limbs


Lymphatic: no nodes


Deviation from normal: flat affect, mild delusions, no hallucinations


Skin: no rash, normal turgor, cap refill <2 seconds





Dx/Plan


(1) Syncope and collapse


Code(s): R55 - SYNCOPE AND COLLAPSE   Status: Acute   





(2) Bipolar disorder


Code(s): F31.9 - BIPOLAR DISORDER, UNSPECIFIED   Status: Chronic   


Qualifiers: 


   Active/Remission status: currently active   Current bipolar episode type: 

mixed   Current episode severity: unspecified   Qualified Code(s): F31.60 - 

Bipolar disorder, current episode mixed, unspecified   





(3) Dyslipidemia


Code(s): E78.5 - HYPERLIPIDEMIA, UNSPECIFIED   Status: Chronic   





(4) Hypertension


Code(s): I10 - ESSENTIAL (PRIMARY) HYPERTENSION   Status: Chronic   


Qualifiers: 


   Hypertension type: essential hypertension   Qualified Code(s): I10 - 

Essential (primary) hypertension   





(5) Tobacco abuse


Code(s): Z72.0 - TOBACCO USE   Status: Chronic   





- Plan


cont current plan of care





* .follow up on cardiology recommendations.  continue to observe on tele for now

## 2018-09-05 NOTE — CON
DATE OF CONSULTATION:  09/04/2018

 

HISTORY OF PRESENT ILLNESS:  Courtney Borja is a 62-year-old white female 
with a longstanding history of bipolar disorder.  She states she was just 
discharged from [facility] 2 days prior to this 
admission.  She states she has had cough productive of sputum.  Two weeks ago, 
she also stated that she had a fall.  She also had a fall recently, tripping 
over a laundry basket, hitting her head and stated that she was knocked out 
afterwards.  She denies any chest discomfort.  She denies any shortness of 
breath.  She came to the hospital for nausea, but was able to eat in the 
emergency room without any nausea or vomiting.  Last evening, she had an 
episode of bradycardia with a 3-second pause.  She states that at that time she 
was in bed and has been having continual pain from nephrolithiasis.

 

PAST MEDICAL HISTORY:  Diabetes, hypertension, hyperlipidemia, COPD, bipolar 
disorder, nephrolithiasis.

 

OPERATIONS:  Tonsils, appendix, cholecystectomy, hysterectomy, surgery for 
"fractured neck."

 

MEDICATIONS:  At home include Vistaril 100 mg t.i.d., lidocaine patch 2 patches 
daily, lithium carbonate 300 mg q.a.m., prednisone 20 daily, risperidone 4 mg 
daily, sertraline 200 mg daily, Desyrel 200 mg at bedtime.

 

ALLERGIES:  PENICILLIN, MORPHINE, CODEINE.

 

SOCIAL HISTORY:  She smokes.

 

PHYSICAL EXAMINATION:

VITAL SIGNS:  Blood pressure 183/87, pulse of 98.

HEENT:  PERRL.

NECK:  Supple.

LUNGS:  Chest is clear.

CARDIAC:  S1 and S2 normal, without any S3 or S4, or murmurs.

ABDOMEN:  Normal bowel sounds, without tenderness.

EXTREMITIES:  Revealed no clubbing, cyanosis, or edema.

NEUROLOGIC:  Grossly intact.

SKIN:  Warm and dry.

 

LABORATORY DATA:  EKG reveals normal sinus rhythm with left axis deviation.  
CBC is unremarkable.  D-dimer 0.33.  Sodium 137, potassium 3.7, chloride 104, 
carbon dioxide 23, BUN 13, creatinine 0.75.  AST and ALT are normal.  Cardiac 
enzymes were unremarkable.

 

IMPRESSION:

1.  Sinus arrhythmia with three second sinus pause.  It does not sound like she 
was symptomatic with this.  It could be due to vasovagal reaction from her 
constant pain from nephrolithiasis.

2.  Multiple falls; however, I am uncertain if she had true syncope with those.

3.  Hypertension.

4.  Hyperlipidemia.

5.  Bipolar disorder.

6.  Smoker.

 

PLAN:  Echocardiogram will be performed to assess left ventricular function.  
The patient will continue to be monitored.  Consideration should be given to 30-
day monitor at the time of discharge if she does not have any further 
significant arrhythmias.  Certainly, beta blocking agents should be avoided as 
well as calcium channel blockers that can affect AV conduction.

 

MTDD

## 2018-09-06 VITALS — DIASTOLIC BLOOD PRESSURE: 64 MMHG | SYSTOLIC BLOOD PRESSURE: 143 MMHG | TEMPERATURE: 98.9 F

## 2018-09-06 RX ADMIN — HYDROCODONE BITARTRATE AND ACETAMINOPHEN PRN TAB: 5; 325 TABLET ORAL at 18:48

## 2018-09-06 RX ADMIN — HYDROCODONE BITARTRATE AND ACETAMINOPHEN PRN TAB: 5; 325 TABLET ORAL at 09:00

## 2018-09-06 RX ADMIN — HYDROCODONE BITARTRATE AND ACETAMINOPHEN PRN TAB: 5; 325 TABLET ORAL at 01:12

## 2018-09-06 NOTE — DIS
DATE OF ADMISSION:  09/03/2018

 

DATE OF DISCHARGE:  09/06/2018

 

PRIMARY CARE PHYSICIAN:  None.

 

DISCHARGE DIAGNOSES:

1.  Sinus arrhythmia.

2.  Syncope and collapse.

3.  Bipolar disorder, marginally controlled.

4.  Hyperlipidemia.

5.  Ongoing tobacco abuse.

6.  Chronic back pain.

 

CONSULTATIONS:  Cardiology.

 

PROCEDURES:  A 30-day monitor placement.

 

HISTORY AND PHYSICAL:  Ms. Borja is a 62-year-old female who was admitted by me on 09/03/2018 a
fter being found in her neighbor's yard having passed out unresponsive.  EMS brought her to the emerg
ency department for evaluation.  Here, her workup was negative and we were called for admission.

 

HOSPITAL COURSE:  The patient was seen and examined by me in the Emergency Department, she was placed
 in observation and started on telemetry monitoring.  Serial cardiac biomarkers were obtained and wer
e negative.  Cardiology was consulted.

 

Overnight 09/03/2018 to 09/04/2018, the patient had a 3-4 second pause on her telemetry without any i
ntervention while she was sleeping.  She was asymptomatic.  She was followed by Cardiology and underw
ent echocardiogram.  This ordered on 09/04/2018, performed on 09/05/2018.

 

Echo was unremarkable.  She had no further pauses and was set up with a 30-day monitor and cleared fo
r discharge by Cardiology today 09/06/2018.

 

The patient was seen and examined on the day of discharge.

 

Discharge plan and disposition discussed with patient at the bedside.

 

DISCHARGE MEDICATIONS:

1.  Hydroxyzine 100 mg p.o. t.i.d.

2.  Lidocaine patch 2 patch transdermal daily.

3.  Lithium carbonate 300 mg p.o. q.a.m. and 600 mg p.o. q.p.m.

4.  Prednisone 20 mg daily to resume.

5.  Risperidone 4 mg daily.

6.  Zoloft 200 mg daily.

7.  Trazodone 200 mg p.o. at bedtime.

 

DISCHARGE ACTIVITY:  As tolerated.

 

DISCHARGE DIET:  Heart healthy diet recommended.

 

DISCHARGE CONDITION:  Stable.

 

DISPOSITION:  The patient has been discharged to home via private vehicle.  Followup appointment with
 Dr. Mcdermott in 3-4 weeks after a 30-day monitoring.

## 2018-09-08 NOTE — EKG
Test Reason : FALL

Blood Pressure : ***/*** mmHG

Vent. Rate : 099 BPM     Atrial Rate : 099 BPM

   P-R Int : 152 ms          QRS Dur : 098 ms

    QT Int : 368 ms       P-R-T Axes : 031 004 025 degrees

   QTc Int : 472 ms

 

** ** ** ** * Pediatric ECG Analysis * ** ** ** **

Normal sinus rhythm

Left axis deviation

Borderline Prolonged QT

 

Confirmed by SAVANNA BIRCH, NAT ([id number]),  JOSSELIN MACKAY ([id number]) on 9/8/2018 8:30:16 PM

 

Referred By:  JOB           Confirmed By:NAT CORRALES MD

## 2018-09-08 NOTE — EKG
Test Reason : 

Blood Pressure : ***/*** mmHG

Vent. Rate : 100 BPM     Atrial Rate : 100 BPM

   P-R Int : 174 ms          QRS Dur : 096 ms

    QT Int : 374 ms       P-R-T Axes : 072 034 071 degrees

   QTc Int : 482 ms

 

Normal sinus rhythm

Possible Left atrial enlargement

Incomplete right bundle branch block

Prolonged QT

Abnormal ECG

 

Confirmed by DUBIN M.D., RICHARD ([id number]),  JOSSELIN MACKAY ([id number]) on 9/8/2018 7:12:00 PM

 

Referred By:             Confirmed By:RICHARD DUBIN M.D.

## 2018-09-09 ENCOUNTER — HOSPITAL ENCOUNTER (EMERGENCY)
Dept: HOSPITAL 92 - ERS | Age: 63
Discharge: HOME | End: 2018-09-09
Payer: MEDICARE

## 2018-09-09 DIAGNOSIS — E78.5: ICD-10-CM

## 2018-09-09 DIAGNOSIS — Z79.899: ICD-10-CM

## 2018-09-09 DIAGNOSIS — F31.9: ICD-10-CM

## 2018-09-09 DIAGNOSIS — M79.89: ICD-10-CM

## 2018-09-09 DIAGNOSIS — Z00.00: Primary | ICD-10-CM

## 2018-09-09 DIAGNOSIS — F17.210: ICD-10-CM

## 2018-09-09 PROCEDURE — 99281 EMR DPT VST MAYX REQ PHY/QHP: CPT

## 2018-09-10 ENCOUNTER — HOSPITAL ENCOUNTER (EMERGENCY)
Dept: HOSPITAL 57 - BURERS | Age: 63
Discharge: HOME | End: 2018-09-10
Payer: MEDICARE

## 2018-09-10 ENCOUNTER — HOSPITAL ENCOUNTER (EMERGENCY)
Dept: HOSPITAL 92 - ERS | Age: 63
Discharge: HOME | End: 2018-09-10
Payer: MEDICARE

## 2018-09-10 DIAGNOSIS — Z79.899: ICD-10-CM

## 2018-09-10 DIAGNOSIS — F31.9: ICD-10-CM

## 2018-09-10 DIAGNOSIS — I10: ICD-10-CM

## 2018-09-10 DIAGNOSIS — M25.532: ICD-10-CM

## 2018-09-10 DIAGNOSIS — E11.9: ICD-10-CM

## 2018-09-10 DIAGNOSIS — E78.5: ICD-10-CM

## 2018-09-10 DIAGNOSIS — M54.9: Primary | ICD-10-CM

## 2018-09-10 DIAGNOSIS — M25.531: Primary | ICD-10-CM

## 2018-09-10 DIAGNOSIS — Z71.6: ICD-10-CM

## 2018-09-10 DIAGNOSIS — F17.210: ICD-10-CM

## 2018-09-10 DIAGNOSIS — Z79.4: ICD-10-CM

## 2018-09-10 LAB
ALBUMIN SERPL BCG-MCNC: 3.7 G/DL (ref 3.5–5)
ALP SERPL-CCNC: 75 U/L (ref 40–150)
ALT SERPL W P-5'-P-CCNC: 13 U/L (ref 8–55)
ANION GAP SERPL CALC-SCNC: 12 MMOL/L (ref 10–20)
AST SERPL-CCNC: 9 U/L (ref 5–34)
BACTERIA UR QL AUTO: (no result) HPF
BASOPHILS # BLD AUTO: 0 THOU/UL (ref 0–0.2)
BASOPHILS NFR BLD AUTO: 0.2 % (ref 0–1)
BILIRUB SERPL-MCNC: 0.3 MG/DL (ref 0.2–1.2)
BUN SERPL-MCNC: 8 MG/DL (ref 9.8–20.1)
CALCIUM SERPL-MCNC: 9.4 MG/DL (ref 7.8–10.44)
CHLORIDE SERPL-SCNC: 112 MMOL/L (ref 98–107)
CK MB SERPL-MCNC: 2.7 NG/ML (ref 0–6.6)
CK SERPL-CCNC: 41 U/L (ref 29–168)
CO2 SERPL-SCNC: 23 MMOL/L (ref 22–29)
CREAT CL PREDICTED SERPL C-G-VRATE: 0 ML/MIN (ref 70–130)
CRYSTAL-AUWI FLAG: 0.1 (ref 0–15)
EOSINOPHIL # BLD AUTO: 0.3 THOU/UL (ref 0–0.7)
EOSINOPHIL NFR BLD AUTO: 3 % (ref 0–10)
GLOBULIN SER CALC-MCNC: 2 G/DL (ref 2.4–3.5)
GLUCOSE SERPL-MCNC: 88 MG/DL (ref 70–105)
HEV IGM SER QL: 12 (ref 0–7.99)
HGB BLD-MCNC: 12.8 G/DL (ref 12–16)
HYALINE CASTS #/AREA URNS LPF: (no result) LPF
LYMPHOCYTES # BLD: 1.3 THOU/UL (ref 1.2–3.4)
LYMPHOCYTES NFR BLD AUTO: 15.1 % (ref 21–51)
MCH RBC QN AUTO: 31.6 PG (ref 27–31)
MCV RBC AUTO: 95.8 FL (ref 78–98)
MONOCYTES # BLD AUTO: 0.6 THOU/UL (ref 0.11–0.59)
MONOCYTES NFR BLD AUTO: 7.2 % (ref 0–10)
NEUTROPHILS # BLD AUTO: 6.4 THOU/UL (ref 1.4–6.5)
NEUTROPHILS NFR BLD AUTO: 74.5 % (ref 42–75)
PATHC CAST-AUWI FLAG: 0 (ref 0–2.49)
PLATELET # BLD AUTO: 174 THOU/UL (ref 130–400)
POTASSIUM SERPL-SCNC: 3.6 MMOL/L (ref 3.5–5.1)
RBC # BLD AUTO: 4.04 MILL/UL (ref 4.2–5.4)
RBC UR QL AUTO: (no result) HPF (ref 0–3)
SODIUM SERPL-SCNC: 143 MMOL/L (ref 136–145)
SP GR UR STRIP: 1.01 (ref 1–1.04)
SPERM-AUWI FLAG: 0 (ref 0–9.9)
TROPONIN I SERPL DL<=0.01 NG/ML-MCNC: (no result) NG/ML (ref ?–0.03)
WBC # BLD AUTO: 8.6 THOU/UL (ref 4.8–10.8)
YEAST-AUWI FLAG: 0 (ref 0–25)

## 2018-09-10 PROCEDURE — 99406 BEHAV CHNG SMOKING 3-10 MIN: CPT

## 2018-09-10 PROCEDURE — 99283 EMERGENCY DEPT VISIT LOW MDM: CPT

## 2018-09-10 PROCEDURE — 36415 COLL VENOUS BLD VENIPUNCTURE: CPT

## 2018-09-10 PROCEDURE — 93005 ELECTROCARDIOGRAM TRACING: CPT

## 2018-09-10 PROCEDURE — 80053 COMPREHEN METABOLIC PANEL: CPT

## 2018-09-10 PROCEDURE — 81003 URINALYSIS AUTO W/O SCOPE: CPT

## 2018-09-10 PROCEDURE — 84484 ASSAY OF TROPONIN QUANT: CPT

## 2018-09-10 PROCEDURE — 85025 COMPLETE CBC W/AUTO DIFF WBC: CPT

## 2018-09-10 PROCEDURE — 81015 MICROSCOPIC EXAM OF URINE: CPT

## 2018-09-10 PROCEDURE — 82553 CREATINE MB FRACTION: CPT

## 2018-09-30 ENCOUNTER — HOSPITAL ENCOUNTER (INPATIENT)
Dept: HOSPITAL 92 - ERS | Age: 63
LOS: 15 days | Discharge: HOME HEALTH | DRG: 871 | End: 2018-10-15
Attending: INTERNAL MEDICINE | Admitting: INTERNAL MEDICINE
Payer: MEDICARE

## 2018-09-30 VITALS — BODY MASS INDEX: 24 KG/M2

## 2018-09-30 DIAGNOSIS — R82.5: ICD-10-CM

## 2018-09-30 DIAGNOSIS — F20.9: ICD-10-CM

## 2018-09-30 DIAGNOSIS — E87.2: ICD-10-CM

## 2018-09-30 DIAGNOSIS — J44.9: ICD-10-CM

## 2018-09-30 DIAGNOSIS — E11.9: ICD-10-CM

## 2018-09-30 DIAGNOSIS — A41.9: Primary | ICD-10-CM

## 2018-09-30 DIAGNOSIS — K74.60: ICD-10-CM

## 2018-09-30 DIAGNOSIS — E78.5: ICD-10-CM

## 2018-09-30 DIAGNOSIS — Z88.5: ICD-10-CM

## 2018-09-30 DIAGNOSIS — F17.210: ICD-10-CM

## 2018-09-30 DIAGNOSIS — E87.6: ICD-10-CM

## 2018-09-30 DIAGNOSIS — F31.9: ICD-10-CM

## 2018-09-30 DIAGNOSIS — G92: ICD-10-CM

## 2018-09-30 DIAGNOSIS — N30.00: ICD-10-CM

## 2018-09-30 DIAGNOSIS — Z88.0: ICD-10-CM

## 2018-09-30 DIAGNOSIS — R60.9: ICD-10-CM

## 2018-09-30 DIAGNOSIS — A04.72: ICD-10-CM

## 2018-09-30 DIAGNOSIS — I10: ICD-10-CM

## 2018-09-30 DIAGNOSIS — E86.0: ICD-10-CM

## 2018-09-30 DIAGNOSIS — D64.9: ICD-10-CM

## 2018-09-30 DIAGNOSIS — N17.9: ICD-10-CM

## 2018-09-30 LAB
ALBUMIN SERPL BCG-MCNC: 3.4 G/DL (ref 3.5–5)
ALP SERPL-CCNC: 91 U/L (ref 40–150)
ALT SERPL W P-5'-P-CCNC: (no result) U/L (ref 8–55)
ANION GAP SERPL CALC-SCNC: 30 MMOL/L (ref 10–20)
APAP SERPL-MCNC: (no result) MCG/ML (ref 10–30)
AST SERPL-CCNC: 5 U/L (ref 5–34)
BACTERIA UR QL AUTO: (no result) HPF
BASOPHILS # BLD AUTO: 0 THOU/UL (ref 0–0.2)
BASOPHILS NFR BLD AUTO: 0 % (ref 0–1)
BICARBONATE (HCO3V): 12.4 MMOL/L (ref 1–85)
BILIRUB SERPL-MCNC: 0.4 MG/DL (ref 0.2–1.2)
BUN SERPL-MCNC: 123 MG/DL (ref 9.8–20.1)
CA-I BLD-SCNC: 1.31 MMOL/L (ref 1.12–1.32)
CALCIUM SERPL-MCNC: 9.6 MG/DL (ref 7.8–10.44)
CASTS #/AREA URNS LPF: (no result) LPF
CHLORIDE SERPL-SCNC: 103 MMOL/L (ref 98–107)
CHLORIDE SERPL-SCNC: 126 MMOL/L (ref 98–113)
CK MB SERPL-MCNC: 1.6 NG/ML (ref 0–6.6)
CK SERPL-CCNC: 20 U/L (ref 29–168)
CO2 BLDV CALC-SCNC: 13.5 MMOL/L (ref 1–85)
CO2 SERPL-SCNC: 8 MMOL/L (ref 22–29)
CO2 TENSION (PVCO2): 36.7 MMHG (ref 41–51)
CREAT CL PREDICTED SERPL C-G-VRATE: 0 ML/MIN (ref 70–130)
DRUG SCREEN CUTOFF: (no result)
EOSINOPHIL # BLD AUTO: 0.2 THOU/UL (ref 0–0.7)
EOSINOPHIL NFR BLD AUTO: 1.3 % (ref 0–10)
GLOBULIN SER CALC-MCNC: 2.2 G/DL (ref 2.4–3.5)
GLUCOSE SERPL-MCNC: 98 MG/DL (ref 70–105)
HCT VFR BLD CALC: 35 % (ref 36–52)
HEMOGLOBIN - CALC: 12.1 G/DL (ref 12–18)
HGB BLD-MCNC: 13.3 G/DL (ref 12–16)
HYALINE CASTS #/AREA URNS LPF: (no result) LPF
LIPASE SERPL-CCNC: 32 U/L (ref 8–78)
LYMPHOCYTES # BLD: 0.5 THOU/UL (ref 1.2–3.4)
LYMPHOCYTES NFR BLD AUTO: 4 % (ref 21–51)
MCH RBC QN AUTO: 28.9 PG (ref 27–31)
MCV RBC AUTO: 91.9 FL (ref 78–98)
MEDTOX CONTROL LINE VALID?: (no result)
MEDTOX READER #: (no result)
MONOCYTES # BLD AUTO: 0.4 THOU/UL (ref 0.11–0.59)
MONOCYTES NFR BLD AUTO: 3.5 % (ref 0–10)
NEUTROPHILS # BLD AUTO: 11.4 THOU/UL (ref 1.4–6.5)
NEUTROPHILS NFR BLD AUTO: 91.2 % (ref 42–75)
O2 TENSION (PVO2): 37.4 MMHG (ref 35–45)
PLATELET # BLD AUTO: 313 THOU/UL (ref 130–400)
POTASSIUM SERPL-SCNC: 4.2 MMOL/L (ref 3.5–5.1)
POTASSIUM SERPL-SCNC: 4.5 MMOL/L (ref 3.4–4.7)
PROT UR STRIP.AUTO-MCNC: 100 MG/DL
RBC # BLD AUTO: 4.58 MILL/UL (ref 4.2–5.4)
RBC UR QL AUTO: (no result) HPF (ref 0–3)
SALICYLATES SERPL-MCNC: (no result) MG/DL (ref 15–30)
SAO2 % BLDV FROM PO2: 55.1 % (ref 94–98)
SODIUM SERPL-SCNC: 136 MMOL/L (ref 138–145)
SODIUM SERPL-SCNC: 137 MMOL/L (ref 136–145)
SP GR UR STRIP: 1.02 (ref 1–1.04)
TROPONIN I SERPL DL<=0.01 NG/ML-MCNC: (no result) NG/ML (ref ?–0.03)
WBC # BLD AUTO: 12.5 THOU/UL (ref 4.8–10.8)
WBC UR QL AUTO: (no result) HPF (ref 0–3)
YEAST FLD HPF-#/AREA: (no result) HPF

## 2018-09-30 PROCEDURE — 93005 ELECTROCARDIOGRAM TRACING: CPT

## 2018-09-30 PROCEDURE — 87493 C DIFF AMPLIFIED PROBE: CPT

## 2018-09-30 PROCEDURE — 71045 X-RAY EXAM CHEST 1 VIEW: CPT

## 2018-09-30 PROCEDURE — 86803 HEPATITIS C AB TEST: CPT

## 2018-09-30 PROCEDURE — 84443 ASSAY THYROID STIM HORMONE: CPT

## 2018-09-30 PROCEDURE — 87040 BLOOD CULTURE FOR BACTERIA: CPT

## 2018-09-30 PROCEDURE — 93970 EXTREMITY STUDY: CPT

## 2018-09-30 PROCEDURE — 83550 IRON BINDING TEST: CPT

## 2018-09-30 PROCEDURE — 80178 ASSAY OF LITHIUM: CPT

## 2018-09-30 PROCEDURE — 82104 ALPHA-1-ANTITRYPSIN PHENO: CPT

## 2018-09-30 PROCEDURE — 80306 DRUG TEST PRSMV INSTRMNT: CPT

## 2018-09-30 PROCEDURE — 83605 ASSAY OF LACTIC ACID: CPT

## 2018-09-30 PROCEDURE — 80307 DRUG TEST PRSMV CHEM ANLYZR: CPT

## 2018-09-30 PROCEDURE — 85025 COMPLETE CBC W/AUTO DIFF WBC: CPT

## 2018-09-30 PROCEDURE — 82105 ALPHA-FETOPROTEIN SERUM: CPT

## 2018-09-30 PROCEDURE — 83516 IMMUNOASSAY NONANTIBODY: CPT

## 2018-09-30 PROCEDURE — 82553 CREATINE MB FRACTION: CPT

## 2018-09-30 PROCEDURE — 82330 ASSAY OF CALCIUM: CPT

## 2018-09-30 PROCEDURE — 82103 ALPHA-1-ANTITRYPSIN TOTAL: CPT

## 2018-09-30 PROCEDURE — 96367 TX/PROPH/DG ADDL SEQ IV INF: CPT

## 2018-09-30 PROCEDURE — 80053 COMPREHEN METABOLIC PANEL: CPT

## 2018-09-30 PROCEDURE — 87340 HEPATITIS B SURFACE AG IA: CPT

## 2018-09-30 PROCEDURE — 80202 ASSAY OF VANCOMYCIN: CPT

## 2018-09-30 PROCEDURE — 82140 ASSAY OF AMMONIA: CPT

## 2018-09-30 PROCEDURE — 83690 ASSAY OF LIPASE: CPT

## 2018-09-30 PROCEDURE — 83735 ASSAY OF MAGNESIUM: CPT

## 2018-09-30 PROCEDURE — 86704 HEP B CORE ANTIBODY TOTAL: CPT

## 2018-09-30 PROCEDURE — 84484 ASSAY OF TROPONIN QUANT: CPT

## 2018-09-30 PROCEDURE — 87899 AGENT NOS ASSAY W/OPTIC: CPT

## 2018-09-30 PROCEDURE — 94760 N-INVAS EAR/PLS OXIMETRY 1: CPT

## 2018-09-30 PROCEDURE — 51702 INSERT TEMP BLADDER CATH: CPT

## 2018-09-30 PROCEDURE — 81003 URINALYSIS AUTO W/O SCOPE: CPT

## 2018-09-30 PROCEDURE — 96365 THER/PROPH/DIAG IV INF INIT: CPT

## 2018-09-30 PROCEDURE — 82803 BLOOD GASES ANY COMBINATION: CPT

## 2018-09-30 PROCEDURE — 87086 URINE CULTURE/COLONY COUNT: CPT

## 2018-09-30 PROCEDURE — 87324 CLOSTRIDIUM AG IA: CPT

## 2018-09-30 PROCEDURE — 80069 RENAL FUNCTION PANEL: CPT

## 2018-09-30 PROCEDURE — 83540 ASSAY OF IRON: CPT

## 2018-09-30 PROCEDURE — 87046 STOOL CULTR AEROBIC BACT EA: CPT

## 2018-09-30 PROCEDURE — 87045 FECES CULTURE AEROBIC BACT: CPT

## 2018-09-30 PROCEDURE — 81015 MICROSCOPIC EXAM OF URINE: CPT

## 2018-09-30 PROCEDURE — 96361 HYDRATE IV INFUSION ADD-ON: CPT

## 2018-09-30 PROCEDURE — 36415 COLL VENOUS BLD VENIPUNCTURE: CPT

## 2018-09-30 PROCEDURE — 70450 CT HEAD/BRAIN W/O DYE: CPT

## 2018-09-30 PROCEDURE — 87449 NOS EACH ORGANISM AG IA: CPT

## 2018-09-30 PROCEDURE — 86706 HEP B SURFACE ANTIBODY: CPT

## 2018-09-30 PROCEDURE — 86709 HEPATITIS A IGM ANTIBODY: CPT

## 2018-09-30 PROCEDURE — 36416 COLLJ CAPILLARY BLOOD SPEC: CPT

## 2018-09-30 PROCEDURE — 82550 ASSAY OF CK (CPK): CPT

## 2018-09-30 NOTE — RAD
RADIOGRAPH CHEST 1 VIEW:

 

HISTORY: 

62-year-old hypotensive female unable to ambulate. 

 

FINDINGS:

There are no air space densities, pulmonary edema, pneumothorax, or cardiomegaly.  The lateral costop
hrenic angles are sharp.

 

IMPRESSION:  

No acute cardiopulmonary findings.

 

 

chucky

 

POS: SCOTTY

## 2018-09-30 NOTE — CT
CT BRAIN WITHOUT CONTRAST:

 

History: Altered mental status. 

 

FINDINGS: 

Comparison is made with exam of 9-3-18. 

 

No evidence of acute infarct, hemorrhage, midline shift, or abnormal extraaxial fluid collections are
 seen. The ventricular size is normal and the basilar cisterns patent. The bony calvarium is intact. 
There is mild mucosal disease in the paranasal sinuses. 

 

IMPRESSION: 

No CT evidence of acute intracranial process.

 

 

POS: MZA

## 2018-10-01 LAB
ALBUMIN SERPL BCG-MCNC: 2.9 G/DL (ref 3.5–5)
ANION GAP SERPL CALC-SCNC: 21 MMOL/L (ref 10–20)
BASOPHILS # BLD AUTO: 0 THOU/UL (ref 0–0.2)
BASOPHILS NFR BLD AUTO: 0 % (ref 0–1)
BUN SERPL-MCNC: 117 MG/DL (ref 9.8–20.1)
BUN/CREAT SERPL: 20.49
CALCIUM SERPL-MCNC: 9.2 MG/DL (ref 7.8–10.44)
CHLORIDE SERPL-SCNC: 105 MMOL/L (ref 98–107)
CO2 SERPL-SCNC: 15 MMOL/L (ref 22–29)
CREAT CL PREDICTED SERPL C-G-VRATE: 10 ML/MIN (ref 70–130)
EOSINOPHIL # BLD AUTO: 0.1 THOU/UL (ref 0–0.7)
EOSINOPHIL NFR BLD AUTO: 1.2 % (ref 0–10)
GLUCOSE SERPL-MCNC: 109 MG/DL (ref 70–105)
HGB BLD-MCNC: 11.1 G/DL (ref 12–16)
LYMPHOCYTES # BLD: 0.6 THOU/UL (ref 1.2–3.4)
LYMPHOCYTES NFR BLD AUTO: 5.4 % (ref 21–51)
MCH RBC QN AUTO: 29.6 PG (ref 27–31)
MCV RBC AUTO: 91.2 FL (ref 78–98)
MONOCYTES # BLD AUTO: 0.6 THOU/UL (ref 0.11–0.59)
MONOCYTES NFR BLD AUTO: 5.9 % (ref 0–10)
NEUTROPHILS # BLD AUTO: 9.3 THOU/UL (ref 1.4–6.5)
NEUTROPHILS NFR BLD AUTO: 87.6 % (ref 42–75)
PLATELET # BLD AUTO: 293 THOU/UL (ref 130–400)
POTASSIUM SERPL-SCNC: 3.2 MMOL/L (ref 3.5–5.1)
RBC # BLD AUTO: 3.77 MILL/UL (ref 4.2–5.4)
SODIUM SERPL-SCNC: 138 MMOL/L (ref 136–145)
VANCOMYCIN SERPL-MCNC: 16.8 UG/ML
WBC # BLD AUTO: 10.6 THOU/UL (ref 4.8–10.8)

## 2018-10-01 RX ADMIN — HEPARIN SODIUM SCH UNITS: 5000 INJECTION, SOLUTION INTRAVENOUS; SUBCUTANEOUS at 00:40

## 2018-10-01 RX ADMIN — HEPARIN SODIUM SCH UNITS: 5000 INJECTION, SOLUTION INTRAVENOUS; SUBCUTANEOUS at 14:31

## 2018-10-01 RX ADMIN — HEPARIN SODIUM SCH UNITS: 5000 INJECTION, SOLUTION INTRAVENOUS; SUBCUTANEOUS at 20:53

## 2018-10-01 RX ADMIN — VANCOMYCIN HYDROCHLORIDE SCH MG: KIT at 22:02

## 2018-10-01 RX ADMIN — VANCOMYCIN HYDROCHLORIDE SCH MG: KIT at 17:18

## 2018-10-01 NOTE — CON
DATE OF CONSULTATION:  09/30/2018

 

CONSULTING PHYSICIAN:  Dr. Kovacs from ER.

 

REASON FOR CONSULTATION:  Acute kidney injury, metabolic acidosis.

 

REASON FOR ADMISSION:  Altered mentation and feeling weakness.

 

HISTORY OF PRESENT ILLNESS:  A 62-year-old female with history of bipolar disorder, hypertension, hyp
erlipidemia, COPD, who came to the hospital with above complaints and Nephrology was consulted for el
evated creatinine and acute kidney injury.  The patient is a poor historian and not able to give much
 history.  The patient denies any nausea, vomiting, no chest pain, no diarrhea, no fever or chills, n
o abdominal pain, no shortness of breath.

 

PAST MEDICAL HISTORY:  Positive for bipolar disorder, type 2 diabetes, hypertension, hyperlipidemia, 
COPD _____.

 

PAST SURGICAL HISTORY:  Appendectomy, tonsillectomy, cholecystectomy, hysterectomy, skin cancer remov
al.

 

HOME MEDICATIONS:  Include trazodone, Risperdal, prednisone, hydroxyzine, sertraline, and lithium.

 

ALLERGIES:  PENICILLIN, MORPHINE, CODEINE.

 

SOCIAL HISTORY:  She smokes few cigarettes per day.  No alcohol or illicit drug abuse.

 

FAMILY HISTORY:  No history of kidney disease.

 

REVIEW OF SYSTEMS:  Could not be obtained due to confusion.

 

PHYSICAL EXAMINATION:

GENERAL:  This is an elderly female in no apparent distress.

VITAL SIGNS:  Temperature 98.6, pulse 70, respiratory rate 18, blood pressure 91/61.

HEENT:  Atraumatic, normocephalic.  Oral mucosa dry.

NECK:  Supple, no masses.

CARDIOVASCULAR:  S1, S2 heard.  Rate and rhythm regular.

RESPIRATORY:  Clear.

GASTROINTESTINAL:  Abdomen is soft.

MUSCULOSKELETAL:  1+ edema.

DERMATOLOGIC:  No skin rash.

NEUROLOGIC:  Alert, slightly confused.

PSYCHIATRIC:  Not assessed.

 

LABORATORY DATA:  Hemoglobin is 13.3, potassium 4.5, bicarbonate 8, , creatinine 7.2.

 

ASSESSMENT AND PLAN:

1.  Acute kidney injury most likely volume depletion.  Agree with hydration.  Check renal ultrasound.


2.  Edema, controlled.

3.  Hypertension, stable.

4.  Metabolic acidosis.  Continue hydration.

5.  Mild leukocytosis.

6.  _____.

7.  Mild hypoalbuminemia.

 

No acute indication for dialysis.  Continue supportive care and we will have a close monitor.  Avoid 
nephrotoxins.

 

Thank you for the consult.

## 2018-10-01 NOTE — PRG
DATE OF SERVICE:  10/01/2018

 

SUBJECTIVE:  Patient was seen and examined at bedside and overnight events noted.  Patient denies any
 shortness of breath or chest pain or palpitation.  No history of nausea or vomiting or diarrhea or f
ever or chills or cramps. 

 

OBJECTIVE:

GENERAL:  This is a thin-built female in no apparent distress.

VITAL SIGNS:  Temperature 98.7, pulse 91, respiratory rate 18, blood pressure 100/39.

HEENT: Atraumatic, normocephalic, Oral mucosa is moist

NECK: Supple 

CARDIOVASCULAR:  S1, S2 heard.  Rate and rhythm regular.

RESPIRATORY:  Clear to auscultation.

GASTROINTESTINAL:  Abdomen is soft.

MUSCULOSKELETAL:  No tenderness, no edema.

DERMATOLOGIC:  No skin rash.

NEUROLOGIC:  Alert, awake, and oriented x3.  No focal neurologic deficits.  Moving all the extremitie
s.

PSYCHIATRIC:  Mood and affect normal.

 

LABORATORY DATA:  Potassium 3.2, BUN is 117, creatinine is 5.7 from 7.2.

 

ASSESSMENT AND PLAN:

1.  Acute kidney injury secondary to volume depletion.  Creatinine getting better with IV hydration.

2.  Metabolic acidosis.  We will continue with bicarbonate with fluids.

3.  Edema _____.

4.  Hypertension, stable.

5.  Mild hypoalbuminemia.

6.  Anemia.  We will monitor.

 

Plan is to continue hydration.  Renal function seems to be getting better.  Continue bicarbonate thro
ugh IV and we will follow.

## 2018-10-01 NOTE — CON
DATE OF CONSULTATION:  10/01/2018

 

CONSULTING PHYSICIAN:  Hospitalist group.

 

REASON FOR CONSULTATION:  IMCU placement.

 

HISTORY OF PRESENT ILLNESS:  This is a 62-year-old female with bipolar disorder 
and schizophrenia who presented with altered mental status.  She is a very poor 
historian.  She was noted to have acute renal failure.  The etiology of that is 
not clear.  The patient is absolutely no help whatsoever in giving history.

 

PAST MEDICAL HISTORY:

1.  Bipolar disorder.

2.  Tobacco abuse.

3.  Diabetes mellitus.

4.  Hypertension.

5.  Hyperlipidemia.

6.  Chronic obstructive pulmonary disease.

 

PAST SURGICAL HISTORY:  Appendectomy, tonsillectomy, cholecystectomy, 
hysterectomy, skin cancer surgery.

 

MEDICATIONS PRIOR TO ADMISSION:  Recent discharge indicates she is on 
hydroxyzine, lithium, prednisone, risperidone, sertraline, trazodone, and 
Lidoderm patches.

 

ALLERGIES:  CODEINE, MORPHINE, PENICILLIN.

 

SOCIAL HISTORY:  Smokes half pack per day.  Denies alcohol or drug use, but her 
drug screen was positive for PCP and cocaine.

 

FAMILY MEDICAL HISTORY:  Remarkable for stroke.

 

REVIEW OF SYSTEMS:  Basically cannot be obtained because the patient is 
uncooperative.

 

PHYSICAL EXAMINATION:

VITAL SIGNS:  Temperature 98.2, pulse 100, respirations 20, O2 sat 96%, blood 
pressure 113/32.

GENERAL:  She will awaken.  She will answer in short sentences.

HEENT:  Pupils react.  Sclerae icteric.  Oropharynx clear.

NECK:  No JVD, no bruits.

LUNGS:  Clear to auscultation without wheezing or rhonchi.

CARDIAC:  S1, S2 regular without audible murmur, rub or gallop.

ABDOMEN:  Soft, nontender, nondistended.  No hepatosplenomegaly.

EXTREMITIES:  No clubbing, cyanosis, or edema.

NEUROLOGIC:  Grossly intact throughout.

 

LABORATORY AND X-RAY FINDINGS:  Sodium 130, potassium 3.2, chloride 105, CO2 15
, , creatinine 5.7, glucose 109.  CPK was 20, albumin 2.9.  TSH 3.45.  
Lithium level was 1.2.  Urine drug screen positive for cocaine and PCP.

 

ASSESSMENT:

1.  Acute renal failure - etiology thought to be volume depletion.

2.  Positive drug screen.

3.  History of hypertension.

4.  History of diabetes mellitus.

5.  History of bipolar disorder and perhaps schizophrenia.

 

PLAN:  

1.  I agree with continued hydration, perhaps you could be even more aggressive 
with that than what you are at the current time.  

2.  She is likely stable to be able to move out to the telemetry unit.  She 
does not appear to be in any danger of impending respiratory failure or cardiac 
instability.



70 minutes time. Of that, >50% spent with patient or on patients hospital unit

 

MTDD

## 2018-10-01 NOTE — PDOC.PN
- Subjective


Encounter Start Date: 10/01/18


Encounter Start Time: 08:40


Subjective: awake, not oriented


-: not in distress





- Objective


Resuscitation Status: 


 











Resuscitation Status           FULL:Full Resuscitation














MAR Reviewed: Yes


Vital Signs & Weight: 


 Vital Signs (12 hours)











  Temp Pulse Resp BP BP Pulse Ox


 


 10/01/18 10:00   91   100/39 L   96


 


 10/01/18 09:10       95


 


 10/01/18 09:00   78   63/20 L   83 L


 


 10/01/18 08:00  98.7 F  92  16  82/27 L  94/32 L  94 L


 


 10/01/18 03:54  98.2 F  100  20   113/32 L  96








 Weight











Weight                         129 lb 4.8 oz














I&O: 


 











 09/30/18 10/01/18 10/02/18





 06:59 06:59 06:59


 


Intake Total  600 


 


Output Total  75 


 


Balance  525 











Result Diagrams: 


 10/01/18 03:55





 10/01/18 03:55


Additional Labs: 


 Accuchecks











  09/30/18





  17:46


 


POC Glucose  102














Phys Exam





- Physical Examination


HEENT: PERRLA


dry mucosa


Neck: no JVD, supple


Respiratory: no wheezing, no rales


Cardiovascular: RRR, no significant murmur


Gastrointestinal: soft, non-tender, positive bowel sounds


Musculoskeletal: no edema, pulses present


Neurological: non-focal, moves all 4 limbs





Dx/Plan


(1) Acute renal failure


Status: Acute   


Qualifiers: 


   Acute renal failure type: unspecified   Qualified Code(s): N17.9 - Acute 

kidney failure, unspecified   





(2) Metabolic acidosis


Code(s): E87.2 - ACIDOSIS   Status: Acute   





(3) Gastroenteritis


Code(s): K52.9 - NONINFECTIVE GASTROENTERITIS AND COLITIS, UNSPECIFIED   Status

: Acute   





(4) Acute encephalopathy


Code(s): G93.40 - ENCEPHALOPATHY, UNSPECIFIED   Status: Acute   





(5) Substance abuse


Code(s): F19.10 - OTHER PSYCHOACTIVE SUBSTANCE ABUSE, UNCOMPLICATED   Status: 

Acute   Comment: pcp and cocaine, but denies   





(6) UTI (urinary tract infection)


Status: Acute   


Qualifiers: 


   Urinary tract infection type: acute cystitis   Hematuria presence: without 

hematuria   Qualified Code(s): N30.00 - Acute cystitis without hematuria   





(7) Bipolar disorder


Code(s): F31.9 - BIPOLAR DISORDER, UNSPECIFIED   Status: Chronic   


Qualifiers: 


   Active/Remission status: remission status unspecified   Qualified Code(s): 

F31.9 - Bipolar disorder, unspecified   





(8) Hypertension


Code(s): I10 - ESSENTIAL (PRIMARY) HYPERTENSION   Status: Chronic   


Qualifiers: 


   Hypertension type: essential hypertension 





(9) Tobacco abuse


Code(s): Z72.0 - TOBACCO USE   Status: Chronic   





- Plan


iv hydration


-: creatinine down to 5.7 this am from 7.2


-: usg venous doppler is -ve


-: prognosis guarded


-: cant reach her daughter with number on [employer] (tried 6 times so far)





* .


is on vanc and cefepime empirically


Will start oral vanc due to severe diarrhea, await stool study results. 


Will need placement or group home due to multiple medical conditions and 

underlying psych disorder plus substance use





Review of Systems





- Medications/Allergies


Allergies/Adverse Reactions: 


 Allergies











Allergy/AdvReac Type Severity Reaction Status Date / Time


 


codeine Allergy   Verified 09/30/18 22:48


 


morphine Allergy   Verified 09/30/18 22:48


 


Penicillins Allergy   Verified 09/30/18 22:48











Medications: 


 Current Medications





Acetaminophen (Tylenol)  650 mg PO Q4H PRN


   PRN Reason: Headache/Fever or Pain


Docusate Sodium (Colace)  100 mg PO BID [facility]


   Last Admin: 10/01/18 00:40 Dose:  Not Given


Famotidine (Pepcid)  20 mg PO DAILY [facility]


Guaifenesin/Dextromethorphan (Robitussin Dm)  15 ml PO Q4H PRN


   PRN Reason: Cough


Heparin Sodium (Porcine) (Heparin)  5,000 units SC BID [facility]


   Last Admin: 10/01/18 00:40 Dose:  5,000 units


Sodium Chloride (Normal Saline 0.9%)  1,000 mls @ 100 mls/hr IV .Q10H [facility]


   Last Admin: 10/01/18 05:44 Dose:  1,000 mls


Cefepime HCl 0.5 gm/ Sodium (Chloride)  50 mls @ 100 mls/hr IVPB 2100 [facility]


Miscellaneous Medication (Pharmacy To Dose)  0 each IVPB ONE [facility]


   Stop: 10/11/18 05:16


Miscellaneous Medication (Vancomycin Sliding Scale)  0 each IVPB 2000 [facility]


Ondansetron HCl (Zofran)  4 mg IVP Q6H PRN


   PRN Reason: Nausea/Vomiting

## 2018-10-01 NOTE — ULT
VENOUS DUPLEX SONOGRAM BILATERAL LOWER EXTREMITIES:

 

History 

Bilateral leg pain and edema.

 

FINDINGS: 

Each common femoral vein and greater saphenous junction are evaluated along with each femoral, deep f
emoral, popliteal, and posterior tibial vein.  There is good color and spectral Doppler flow, isrrael
tan, and augmentation.

 

IMPRESSION: 

No sonographic evidence of deep vein thrombosis within either lower extremity.

 

POS: SCOTTY

## 2018-10-01 NOTE — HP
DATE OF ADMISSION:  2018

 

REASON FOR ADMISSION:  Acute renal failure, severe metabolic acidosis, possible 
sepsis, severe deconditioning and acute encephalopathy.

 

HISTORY OF PRESENT ILLNESS:  Please note majority of this history is obtained 
by talking to ER physician, Dr. Kovacs, as the patient is not fully oriented.  
Per ER records and physician, the patient's [employer] members called her multiple 
times when she did not turn up to [employer] this morning.  They in turn called EMS 
to do a welfare check on her.  When EMS arrived, the patient was on the floor 
and was covered with feces.  She was not oriented.  She was very lethargic and 
could not get up.  She was brought to emergency room here.  On arrival here, 
the patient was found to have had a BUN of 123, creatinine of 7.24 with serum 
bicarbonate of 8.  The patient currently states she has had nausea, vomiting 
and was unable to eat.  She also has had diarrhea.  She says all of these have 
been going on for 2 weeks or so.  The patient was discharged from the hospital 
on the 6th of this month with normal creatinine.

 

PAST MEDICAL AND SURGICAL HISTORY:  History of bipolar disorder, likely 
schizophrenia, diet controlled diabetes, hypertension, dyslipidemia, COPD, 
appendectomy, tonsillectomy, cholecystectomy, hysterectomy, skin cancer removed
, femoral fracture.

 

CURRENT MEDICATIONS:  Please note, the patient cannot recall any of her 
medication.  From her recent discharge, the patient is on hydroxyzine 100 mg 3 
times daily, lithium 300 mg p.o. q.a.m., prednisone 20 mg daily, risperidone 4 
mg daily, sertraline 200 mg daily, trazodone 200 mg p.o. at bedtime, Lidoderm 
transdermal patch 5% patch daily.

 

ALLERGIES:  Allergic to CODEINE, MORPHINE and PENICILLIN.

 

PERSONAL HISTORY:  Smokes half pack a day.  Does not abuse alcohol or drugs.  
The patient states she stays alone.

 

FAMILY HISTORY:  Both parents have had stroke and are .  Mother  in 
her 60s apparently.  Again, the patient is a very poor historian.

 

CODE STATUS:  Full.  Power of  is her daughter, Ms. Burrell.  Per 
prior records, she apparently lives in [place].

 

REVIEW OF SYSTEMS:  Cannot be obtained as the patient is not oriented.

 

PHYSICAL EXAMINATION:

GENERAL:  The patient is a 62-year-old female who is currently lethargic and is 
not oriented.

VITAL SIGNS:  Blood pressure 96/34, pulse 101 per minute, respiratory rate 24 
per minute, temperature 97.2 degrees Fahrenheit, saturating 93% on room air.

NECK:  Supple.  No elevated JVD.

HEENT:  Extraocular muscles intact.  Pupils reacting to light.  Oral cavity, 
mucous membranes are dry.  Tongue is coated.

CARDIOVASCULAR:  S1, S2 heard.  Regular rhythm.

RESPIRATORY:  Air entry 1+ bilateral.  Scattered rhonchi plus.  No rales.

ABDOMEN:  Soft.  Bowel sounds heard.  No tenderness, rigidity or guarding.

EXTREMITIES:  No peripheral edema or calf tenderness.

VASCULAR:  Peripheral pulses are 1+ bilateral.  No ischemic ulcerations or 
gangrene.

CENTRAL NERVOUS SYSTEM:  No gross focal deficits noted.  The patient is very 
lethargic and is not oriented.

PSYCHIATRIC:  No obvious hallucinations or delusions.

 

LABORATORY AND X-RAY FINDINGS:  White count of 12, H and H 13 and 42, platelet 
count 313,000, MCV is 91, RDW is 15 with 91% neutrophils.  Serum bicarbonate is 
8, , creatinine 7.24, serum glucose 98.  AST, ALT and alkaline 
phosphatase within normal limits.  Albumin is 3.4.  CK-MB 1.6.  Ammonia is 37.  
Troponin I less than 0.01.  Lipase is 32.  TSH 3.45.  CK level of 20.  Serum 
drug screen is negative.  Chest x-ray done shows no acute cardiopulmonary 
abnormalities.  The CT brain official results are pending at present.  EKG done 
shows sinus tachycardia at 111 beats per minute.  There is slight peaking of T 
waves.

 

CLINICAL IMPRESSION AND PLAN:  The patient will be admitted to [facility] for acute 
renal failure with severe metabolic acidosis.  She will be on D5 water with 150 
mEq of sodium bicarbonate.  Dr. Alberts has been informed about the patient by 
Dr. Kovacs.  We will obtain a lithium level stat along with blood, urine and 
stool cultures including Clostridium difficile.  The patient will be on high 
protein renal diet and Nepro 1 can 3 times daily.  The patient appears to be 
severely deconditioned and will obtain PT, OT evaluations in the morning.  If 
the patient's renal function were to worsen, likely she will need dialysis.  
Her creatinine was normal just 3 weeks back when she got discharged from here.  
We will accurately check I's and O's.

 

Code status is full for now.  I have tried to contact her daughter, Ms. Burrell over the phone.  I am unable to do so.

 

[facility] Center[facility]ABEL

## 2018-10-02 LAB
ALBUMIN SERPL BCG-MCNC: 2.7 G/DL (ref 3.5–5)
ANION GAP SERPL CALC-SCNC: 16 MMOL/L (ref 10–20)
BUN SERPL-MCNC: 93 MG/DL (ref 9.8–20.1)
BUN/CREAT SERPL: 37.5
CALCIUM SERPL-MCNC: 9.1 MG/DL (ref 7.8–10.44)
CHLORIDE SERPL-SCNC: 110 MMOL/L (ref 98–107)
CO2 SERPL-SCNC: 19 MMOL/L (ref 22–29)
CREAT CL PREDICTED SERPL C-G-VRATE: 22 ML/MIN (ref 70–130)
GLUCOSE SERPL-MCNC: 111 MG/DL (ref 70–105)
MAGNESIUM SERPL-MCNC: 1.5 MG/DL (ref 1.6–2.6)
POTASSIUM SERPL-SCNC: 2.8 MMOL/L (ref 3.5–5.1)
POTASSIUM SERPL-SCNC: 3.8 MMOL/L (ref 3.5–5.1)
SODIUM SERPL-SCNC: 142 MMOL/L (ref 136–145)

## 2018-10-02 RX ADMIN — DIVALPROEX SODIUM SCH MG: 500 TABLET, DELAYED RELEASE ORAL at 19:49

## 2018-10-02 RX ADMIN — VANCOMYCIN HYDROCHLORIDE SCH MG: KIT at 19:53

## 2018-10-02 RX ADMIN — HEPARIN SODIUM SCH UNITS: 5000 INJECTION, SOLUTION INTRAVENOUS; SUBCUTANEOUS at 10:18

## 2018-10-02 RX ADMIN — VANCOMYCIN HYDROCHLORIDE SCH MG: KIT at 17:33

## 2018-10-02 RX ADMIN — DIVALPROEX SODIUM SCH MG: 250 TABLET, DELAYED RELEASE ORAL at 10:18

## 2018-10-02 RX ADMIN — HEPARIN SODIUM SCH UNITS: 5000 INJECTION, SOLUTION INTRAVENOUS; SUBCUTANEOUS at 19:50

## 2018-10-02 RX ADMIN — VANCOMYCIN HYDROCHLORIDE SCH MG: KIT at 10:19

## 2018-10-02 RX ADMIN — DIVALPROEX SODIUM SCH MG: 500 TABLET, DELAYED RELEASE ORAL at 19:50

## 2018-10-02 RX ADMIN — VANCOMYCIN HYDROCHLORIDE SCH MG: KIT at 14:16

## 2018-10-02 NOTE — PQF
DATE:     10-2-18                                                              
            ATTN:  DR. PAULA PRAKASH  



Please exercise your independent, professional judgment in responding to the 
clarification form. 

Clinical indicators are provided on the bottom of this form for your review



Please check appropriate box(s):



[ x ] Encephalopathy:

      Etiology: [  ] Hypertensive     [  ] Metabolic      [  x] Toxic        [  
] Septic

                                                [  ] Drug induced: _____________
__      

                                                  [  ] Unspecified _____________
_ 

                                                 [  ] Other (please specify) 

[  ] Other diagnosis ___________

[  ] Unable to determine



In addition, please specify:

Present on Admission (POA):  [ x ] Yes             [  ] No             [  ] 
Unable to determine



For continuity of documentation, please document condition throughout progress 
notes and discharge summary.  Thank You.



CLINICAL INDICATORS - SIGNS / SYMPTOMS / LABS



ER:    AMS, CONFUSION, FRIENDS SENT EMS FOR A WELL CHECK UP, PT FOUND ON FLOOR 
WITH FECES EVERYWHERE



H&P:    ACUTE RENAL FAILURE, SEVERE METABOLIC ACIDOSIS, POSSIBLE SEPSIS,  
SEVERE DECONDITIONING 

            AND ACUTE ENCEPHALOPATHY



PN DR. DRIVER 10-1-18:   URINE DRUG SCREEN POSITIVE FOR  COCAINE AND PCP,  HX 
OF BIPOLAR AND 

                                           PERHAPS SCHIZOPHRENIA



RISK FACTORS:   H&P:   ACUTE RENAL FAILURE, SEVERE METABOLIC ACIDOSIS, POSSIBLE 
SEPSIS, 

                                            SEVERE DECONDITIONING  AND ACUTE 
ENCEPHALOPATHY



                            PN DR. DRIVER 10-1-18:   URINE DRUG SCREEN 
POSITIVE FOR  COCAINE AND PCP, 

                                                                       HX OF 
BIPOLAR AND PERHAPS SCHIZOPHRENIA



TREATMENTS:    ER:  SODIUM BICARBONATE IV,  CEFEPIME, IVF BOLUS,  VANCOMYCIN





(This form is maintained as a part of the permanent medical record)

 2015 [employer]  All Rights Reserved



[facility]

## 2018-10-02 NOTE — PRG
DATE OF SERVICE:  10/02/2018

 

SUBJECTIVE:  The patient is more alert than she was yesterday.  She does not seem to be in any distre
ss.

 

PHYSICAL EXAMINATION:

VITAL SIGNS:  Temperature is 98.2, pulse 90, respirations 18, O2 sat 99%, blood pressure 107/38.  A 2
4-hour intake 3290, output 3250.

HEENT:  Unremarkable.

NECK:  No JVD.

LUNGS:  Fairly clear anteriorly.

CARDIOVASCULAR:  S1 and S2 regular.

ABDOMEN:  Soft.

EXTREMITIES:  No edema.

 

LABORATORY DATA:  C. diff toxin was positive for the antigen.  Sodium 142, potassium 2.8, chloride 11
0, CO2 19, BUN 93, creatinine 2.4, glucose 111.

 

ASSESSMENT:

1.  The patient presents with acute renal failure secondary to volume depletion.

2.  Positive drug screen.

3.  History of hypertension.

4.  History of diabetes mellitus.

5.  History of bipolar disorder.

 

PLAN:

1.  The patient needs her potassium replaced.

2.  Continue hydration as her BUN and creatinine are improving nicely.

3.  Continue oral vancomycin.  Consider discontinuing IV cefepime if the remainder of her cultures re
main sterile.

## 2018-10-02 NOTE — PDOC.PN
- Subjective


Encounter Start Date: 10/02/18


Encounter Start Time: 12:00


Subjective: awake, not fully oriented


-: responds to verbal questions


-: no abd pain or nausea, she is drinking ensure





- Objective


Resuscitation Status: 


 











Resuscitation Status           FULL:Full Resuscitation














MAR Reviewed: Yes


Vital Signs & Weight: 


 Vital Signs (12 hours)











  Temp Pulse Resp BP BP BP Pulse Ox


 


 10/02/18 11:19  97.4 F L  95  15    117/37 L  99


 


 10/02/18 09:17     96/46 L  105/49 L  


 


 10/02/18 07:58        97


 


 10/02/18 07:48  97.0 F L  87  23 H    99/42 L  100


 


 10/02/18 04:19  98.2 F  90  18    107/38 L  99








 Weight











Admit Weight                   129 lb 4.8 oz


 


Weight                         129 lb 4.8 oz














I&O: 


 











 10/01/18 10/02/18 10/03/18





 06:59 06:59 06:59


 


Intake Total 600 3290 


 


Output Total 75 3250 


 


Balance 525 40 











Result Diagrams: 


 10/01/18 03:55





 10/02/18 03:45





Phys Exam





- Physical Examination


HEENT: PERRLA, moist MMs


Neck: no nodes, no JVD


Respiratory: no wheezing, no rales


Cardiovascular: RRR, no significant murmur


Gastrointestinal: soft, non-tender, no distention, positive bowel sounds


Musculoskeletal: no edema, pulses present


Neurological: non-focal, moves all 4 limbs





Dx/Plan


(1) Clostridium difficile colitis


Status: Acute   





(2) Acute renal failure


Status: Acute   


Qualifiers: 


   Acute renal failure type: unspecified   Qualified Code(s): N17.9 - Acute 

kidney failure, unspecified   





(3) Metabolic acidosis


Code(s): E87.2 - ACIDOSIS   Status: Acute   





(4) Gastroenteritis


Code(s): K52.9 - NONINFECTIVE GASTROENTERITIS AND COLITIS, UNSPECIFIED   Status

: Acute   Comment: sec to c.diff colitis   





(5) Acute encephalopathy


Code(s): G93.40 - ENCEPHALOPATHY, UNSPECIFIED   Status: Acute   





(6) Substance abuse


Code(s): F19.10 - OTHER PSYCHOACTIVE SUBSTANCE ABUSE, UNCOMPLICATED   Status: 

Acute   Comment: pcp and cocaine, but denies   





(7) UTI (urinary tract infection)


Status: Acute   


Qualifiers: 


   Urinary tract infection type: acute cystitis   Hematuria presence: without 

hematuria   Qualified Code(s): N30.00 - Acute cystitis without hematuria   





(8) Bipolar disorder


Code(s): F31.9 - BIPOLAR DISORDER, UNSPECIFIED   Status: Chronic   


Qualifiers: 


   Active/Remission status: remission status unspecified   Qualified Code(s): 

F31.9 - Bipolar disorder, unspecified   





(9) Hypertension


Code(s): I10 - ESSENTIAL (PRIMARY) HYPERTENSION   Status: Chronic   


Qualifiers: 


   Hypertension type: essential hypertension 





(10) Tobacco abuse


Code(s): Z72.0 - TOBACCO USE   Status: Chronic   





- Plan


continue iv hydration, creatinine is down to 2.4


-: continue po vanc


-: has been started on lithium and risperdal


-: PT to mobilize as tolerated, is severely deconditioned


-: will need placement, may tx to med floor





* .








Review of Systems





- Medications/Allergies


Allergies/Adverse Reactions: 


 Allergies











Allergy/AdvReac Type Severity Reaction Status Date / Time


 


codeine Allergy   Verified 09/30/18 22:48


 


morphine Allergy   Verified 09/30/18 22:48


 


Penicillins Allergy   Verified 09/30/18 22:48











Medications: 


 Current Medications





Acetaminophen (Tylenol)  650 mg PO Q4H PRN


   PRN Reason: Headache/Fever or Pain


Divalproex Sodium (Depakote)  500 mg PO HS [facility]


Divalproex Sodium (Depakote)  250 mg PO DAILY [facility]


   Last Admin: 10/02/18 10:18 Dose:  250 mg


Docusate Sodium (Colace)  100 mg PO BID [facility]


   Last Admin: 10/02/18 10:18 Dose:  Not Given


Famotidine (Pepcid)  20 mg PO DAILY [facility]


   Last Admin: 10/02/18 10:18 Dose:  20 mg


Guaifenesin/Dextromethorphan (Robitussin Dm)  15 ml PO Q4H PRN


   PRN Reason: Cough


Heparin Sodium (Porcine) (Heparin)  5,000 units SC BID [facility]


   Last Admin: 10/02/18 10:18 Dose:  5,000 units


Sodium Chloride (Normal Saline 0.9%)  1,000 mls @ 100 mls/hr IV .Q10H [facility]


   Last Admin: 10/02/18 10:17 Dose:  1,000 mls


Lithium Carbonate (Lithium Carbonate)  300 mg PO QAM-WM [facility]


   Last Admin: 10/02/18 10:17 Dose:  300 mg


Ondansetron HCl (Zofran)  4 mg IVP Q6H PRN


   PRN Reason: Nausea/Vomiting


Potassium Chloride (K-Dur)  40 meq PO Q6H [facility]


   Stop: 10/03/18 02:01


   Last Admin: 10/02/18 10:17 Dose:  40 meq


Risperidone (Risperidone)  3 mg PO HS [facility]


Sertraline HCl (Zoloft)  50 mg PO DAILY [facility]


   Last Admin: 10/02/18 10:18 Dose:  50 mg


Trazodone HCl (Desyrel)  100 mg PO HS [facility]


Vancomycin HCl (First Vancomycin)  125 mg PO QID [facility]


   Last Admin: 10/02/18 10:19 Dose:  125 mg

## 2018-10-02 NOTE — PQF
DATE:           10-2-18                                                        
  ATTN:  DR. PAULA PRAKASH



Please exercise your independent, professional judgment in responding to the 
clarification form. 

Clinical indicators are provided on the bottom of this form for your review



Please check appropriate box(s) to clarify if the following diagnosis has been 
ruled in or  ruled out:

_______________SEPSIS_______________________________________



[ x ] Ruled in diagnosis

     [ x ] Continue to treat        [  ] Resolved

[  ] Ruled out diagnosis

[  ] Other diagnosis ___________

[  ] Unable to determine



In addition, please specify:

Present on Admission (POA):  [ x ] Yes             [  ] No             [  ] 
Unable to determine



For continuity of documentation, please document condition throughout progress 
notes and discharge summary.  Thank You.



CLINICAL INDICATORS - SIGNS / SYMPTOMS / LABS



ER DX:   ACUTE RENAL FAILURE,  DEHYDRATION,  SEPSIS



H&P:   ACUTE RENAL FAILURE, SEVERE METABOLIC ACIDOSIS,  POSSIBLE SEPSIS,  
SEVERE DECONDITIONING,  ACUTE ENCEPHALOPATHY



H&P:   PATIENT IS NOT FULLY ORIENTED,   VERY LETHARGIC AND COULD NOT GET UP



PN DR. PRAKASH 10-1-18:   ACUTE RENAL FAILURE,  METABOLIC ACIDOSIS, 
GASTROENTERITIS, 

                                                    ACUTE ENCEPHALOPATHY, UTI



WBC:   9-30-18:   12.5



RISK FACTORS:   ER:  CONFUSION,  SOB,  COUGH,  DIABETES, HTN,  HLD, BIPOLAR



                            PN DR. PRAKASH 10-1-18:   ACUTE RENAL FAILURE,  
METABOLIC ACIDOSIS, GASTROENTERITIS,

                                                                                
ACUTE ENCEPHALOPATHY, UTI



TREATMENTS:   ER:   CEFEPIME, IVF,  VANCOMYCIN,  



(This form is maintained as a part of the permanent medical record)

 2015 [employer].  All Rights Reserved



[facility]

## 2018-10-02 NOTE — PRG
DATE OF SERVICE:  10/02/2018

 

SUBJECTIVE:  Patient was seen and examined at bedside and overnight events noted. 

Patient denies any shortness of breath or chest pain or palpitation.  No history of nausea or vomitin
g or diarrhea or fever or chills or cramps.

 

OBJECTIVE:

GENERAL:  This is a well-built female, in no apparent distress..

VITAL SIGNS:  Temperature 97.4, pulse 94, respiratory rate 15, blood pressure _____.

HEENT:  Atraumatic, normocephalic.  Oral mucosa is moist.

NECK:  Supple.

CARDIOVASCULAR:  S1 and S2 heard. Rate and rhythm regular.

RESPIRATORY:  Clear to auscultation.

GASTROINTESTINAL:  Abdomen is soft.

MUSCULOSKELETAL:  No tenderness.  No edema.

DERMATOLOGIC:  No skin rash.

NEUROLOGIC:  Alert and awake and oriented x3.  No focal neurologic deficits.  Moving all the extremit
ies.

PSYCHIATRIC:  Mood and affect normal.

 

LABORATORY DATA:  Potassium is 2.8, BUN 93, creatinine is 2.5, bicarbonate is 19.

 

ASSESSMENT AND PLAN:

1.  Acute kidney injury secondary to volume depletion.  Creatinine is much better, 2.4 from 7.2 on ad
mission.

2.  Hypokalemia, severe.  Continue aggressive potassium supplements.  We will recheck potassium after
 the IV bag.  Given her diarrhea, oral supplements may not have enough absorption to increase the lev
el.

3.  Metabolic acidosis, better.

4.  Edema, controlled.

5.  Hypertension, stable.

6.  Anemia.

 

Continue hydration as tolerated.  Aggressively replace potassium.

## 2018-10-03 LAB
ALBUMIN SERPL BCG-MCNC: 2.7 G/DL (ref 3.5–5)
ANION GAP SERPL CALC-SCNC: 11 MMOL/L (ref 10–20)
BUN SERPL-MCNC: 50 MG/DL (ref 9.8–20.1)
BUN/CREAT SERPL: 53.76
CALCIUM SERPL-MCNC: 9.1 MG/DL (ref 7.8–10.44)
CHLORIDE SERPL-SCNC: 114 MMOL/L (ref 98–107)
CO2 SERPL-SCNC: 24 MMOL/L (ref 22–29)
CREAT CL PREDICTED SERPL C-G-VRATE: 60 ML/MIN (ref 70–130)
GLUCOSE SERPL-MCNC: 95 MG/DL (ref 70–105)
POTASSIUM SERPL-SCNC: 3.9 MMOL/L (ref 3.5–5.1)
SODIUM SERPL-SCNC: 145 MMOL/L (ref 136–145)

## 2018-10-03 RX ADMIN — VANCOMYCIN HYDROCHLORIDE SCH MG: KIT at 21:23

## 2018-10-03 RX ADMIN — HEPARIN SODIUM SCH UNITS: 5000 INJECTION, SOLUTION INTRAVENOUS; SUBCUTANEOUS at 21:24

## 2018-10-03 RX ADMIN — HEPARIN SODIUM SCH UNITS: 5000 INJECTION, SOLUTION INTRAVENOUS; SUBCUTANEOUS at 08:14

## 2018-10-03 RX ADMIN — VANCOMYCIN HYDROCHLORIDE SCH MG: KIT at 16:00

## 2018-10-03 RX ADMIN — VANCOMYCIN HYDROCHLORIDE SCH MG: KIT at 12:17

## 2018-10-03 RX ADMIN — DIVALPROEX SODIUM SCH MG: 250 TABLET, DELAYED RELEASE ORAL at 08:13

## 2018-10-03 RX ADMIN — DIVALPROEX SODIUM SCH MG: 500 TABLET, DELAYED RELEASE ORAL at 21:23

## 2018-10-03 RX ADMIN — VANCOMYCIN HYDROCHLORIDE SCH MG: KIT at 08:15

## 2018-10-03 NOTE — PRG
DATE OF SERVICE:  10/03/2018

 

SUBJECTIVE:  The patient is doing reasonably well, has no complaints.  She still has a rectal tube in
 place for diarrhea.

 

PHYSICAL EXAMINATION: 

VITAL SIGNS:  On exam her temperature is 96.6, pulse 76, respirations 17, O2 sat 100%, blood pressure
 111/52.

HEENT:  Unremarkable.

NECK:  No JVD.

LUNGS:  Clear, without wheezing or rhonchi.

CARDIOVASCULAR:  S1, S2 regular.

ABDOMEN:  Mildly tender.

EXTREMITIES:  No edema.

 

LABORATORY DATA:  Sodium 145, potassium 3.8, chloride 114, CO2 of 24, BUN 50, creatinine 0.9, glucose
 95.

 

ASSESSMENT:

1.  Clostridium difficile colitis.

2.  Volume depletion secondary to Clostridium difficile colitis.

3.  Prerenal azotemia which is improving after hydration.

4.  Positive drug screen at admission.

5.  Multiple other medical problems.

 

PLAN:  The patient needs to continue therapy for C. difficile colitis.  Her labs have grossly improve
d on the IV hydration.  There are no active pulmonary problems so we will sign off.  Please recall if
 further assistance needed.

## 2018-10-03 NOTE — PRG
DATE OF SERVICE:  10/03/2018

 

SUBJECTIVE:  Patient was seen and examined at bedside and overnight events noted. 

Patient denies any shortness of breath or chest pain or palpitation.  No history of nausea or vomitin
g or diarrhea or fever or chills or cramps.

 

OBJECTIVE:

GENERAL:  This is a well-built female, in no apparent distress.

VITAL SIGNS:  Temperature 98.4, pulse 84, respiratory rate 18, blood pressure 110/55.

HEENT:  Atraumatic, normocephalic.  Oral mucosa is moist.

NECK:  Supple.

CARDIOVASCULAR:  S1 and S2 heard. Rate and rhythm regular.

RESPIRATORY:  Clear to auscultation.

GASTROINTESTINAL:  Abdomen is soft.

MUSCULOSKELETAL:  No tenderness.  No edema.

DERMATOLOGIC:  No skin rash.

NEUROLOGIC:  Alert and awake and oriented x3.  No focal neurologic deficits.  Moving all the extremit
ies.

PSYCHIATRIC:  Mood and affect normal.

 

LABORATORY DATA:  Potassium 3.9, BUN 50, creatinine is 0.9.

 

ASSESSMENT AND PLAN:

1.  Acute kidney injury from volume depletion.

2.  Hypokalemia.

3.  Edema, controlled.

4.  Hypertension.

5.  Anemia.

6.  Metabolic acidosis.

7.  Labs are much better.  I will sign off.  Please call back if any questions.

## 2018-10-04 LAB
ALBUMIN SERPL BCG-MCNC: 2.6 G/DL (ref 3.5–5)
ANION GAP SERPL CALC-SCNC: 8 MMOL/L (ref 10–20)
BUN SERPL-MCNC: 25 MG/DL (ref 9.8–20.1)
BUN/CREAT SERPL: 34.25
CALCIUM SERPL-MCNC: 9.4 MG/DL (ref 7.8–10.44)
CHLORIDE SERPL-SCNC: 115 MMOL/L (ref 98–107)
CO2 SERPL-SCNC: 27 MMOL/L (ref 22–29)
CREAT CL PREDICTED SERPL C-G-VRATE: 77 ML/MIN (ref 70–130)
GLUCOSE SERPL-MCNC: 88 MG/DL (ref 70–105)
POTASSIUM SERPL-SCNC: 4.8 MMOL/L (ref 3.5–5.1)
SODIUM SERPL-SCNC: 145 MMOL/L (ref 136–145)

## 2018-10-04 RX ADMIN — DIVALPROEX SODIUM SCH MG: 250 TABLET, DELAYED RELEASE ORAL at 08:03

## 2018-10-04 RX ADMIN — VANCOMYCIN HYDROCHLORIDE SCH MG: KIT at 17:45

## 2018-10-04 RX ADMIN — HEPARIN SODIUM SCH UNITS: 5000 INJECTION, SOLUTION INTRAVENOUS; SUBCUTANEOUS at 22:08

## 2018-10-04 RX ADMIN — HEPARIN SODIUM SCH UNITS: 5000 INJECTION, SOLUTION INTRAVENOUS; SUBCUTANEOUS at 08:03

## 2018-10-04 RX ADMIN — VANCOMYCIN HYDROCHLORIDE SCH MG: KIT at 15:07

## 2018-10-04 RX ADMIN — VANCOMYCIN HYDROCHLORIDE SCH MG: KIT at 22:08

## 2018-10-04 RX ADMIN — DIVALPROEX SODIUM SCH MG: 500 TABLET, DELAYED RELEASE ORAL at 22:08

## 2018-10-04 RX ADMIN — VANCOMYCIN HYDROCHLORIDE SCH MG: KIT at 08:02

## 2018-10-04 NOTE — PDOC.PN
- Subjective


Encounter Start Date: 10/04/18


Encounter Start Time: 08:35


Subjective: still having lots of liq stools


-: awake, no sob or abd pain


-: follows verbal stimuli





- Objective


Resuscitation Status: 


 











Resuscitation Status           FULL:Full Resuscitation














MAR Reviewed: Yes


Vital Signs & Weight: 


 Vital Signs (12 hours)











  Temp Pulse Resp BP Pulse Ox


 


 10/04/18 10:50  98.4 F  86  20  149/65 H  93 L


 


 10/04/18 08:00      94 L


 


 10/04/18 07:18  98.4 F  80  16  153/77 H  94 L








 Weight











Admit Weight                   129 lb 4.8 oz


 


Weight                         131 lb 8 oz














I&O: 


 











 10/03/18 10/04/18 10/05/18





 06:59 06:59 06:59


 


Intake Total 2950 3300 


 


Output Total 3700 3525 


 


Balance -750 -225 











Result Diagrams: 


 10/01/18 03:55





 10/04/18 03:54





Phys Exam





- Physical Examination


HEENT: PERRLA, sclera anicteric


Neck: no JVD, supple


Respiratory: no wheezing, no rales


Cardiovascular: RRR, no significant murmur


Gastrointestinal: soft, non-tender, no distention, positive bowel sounds


Musculoskeletal: no edema, pulses present


Neurological: non-focal, moves all 4 limbs





Dx/Plan


(1) Clostridium difficile colitis


Status: Acute   





(2) Acute renal failure


Status: Resolved   


Qualifiers: 


   Acute renal failure type: unspecified   Qualified Code(s): N17.9 - Acute 

kidney failure, unspecified   





(3) Metabolic acidosis


Code(s): E87.2 - ACIDOSIS   Status: Resolved   





(4) Gastroenteritis


Code(s): K52.9 - NONINFECTIVE GASTROENTERITIS AND COLITIS, UNSPECIFIED   Status

: Acute   Comment: sec to c.diff colitis   





(5) Acute encephalopathy


Code(s): G93.40 - ENCEPHALOPATHY, UNSPECIFIED   Status: Acute   Comment: 

resolving   





(6) Substance abuse


Code(s): F19.10 - OTHER PSYCHOACTIVE SUBSTANCE ABUSE, UNCOMPLICATED   Status: 

Acute   Comment: pcp and cocaine, but denies   





(7) Bipolar disorder


Code(s): F31.9 - BIPOLAR DISORDER, UNSPECIFIED   Status: Chronic   


Qualifiers: 


   Active/Remission status: remission status unspecified   Qualified Code(s): 

F31.9 - Bipolar disorder, unspecified   





(8) Hypertension


Code(s): I10 - ESSENTIAL (PRIMARY) HYPERTENSION   Status: Chronic   


Qualifiers: 


   Hypertension type: essential hypertension 





(9) Tobacco abuse


Code(s): Z72.0 - TOBACCO USE   Status: Chronic   





- Plan


likely diarrhea got worse with oral electrolyte replacement mgo and phospho


-: will xifaxan to oral vanc


-: may place her on rectal tube, has felecia anal ulceration due to maceration


-: ulceration present on admission (she was laying in stool when found down)


-: will get GI opinion, renal function is at baseline now, continue iv hydrati





* .








Review of Systems





- Medications/Allergies


Allergies/Adverse Reactions: 


 Allergies











Allergy/AdvReac Type Severity Reaction Status Date / Time


 


codeine Allergy   Verified 09/30/18 22:48


 


morphine Allergy   Verified 09/30/18 22:48


 


Penicillins Allergy   Verified 09/30/18 22:48











Medications: 


 Current Medications





Acetaminophen (Tylenol)  650 mg PO Q4H PRN


   PRN Reason: Headache/Fever or Pain


Divalproex Sodium (Depakote)  [facility] mg PO HS [facility]


   Last Admin: 10/03/18 21:23 Dose:  500 mg


Divalproex Sodium (Depakote)  250 mg PO DAILY [facility]


   Last Admin: 10/04/18 08:03 Dose:  250 mg


Famotidine (Pepcid)  20 mg PO DAILY [facility]


   Last Admin: 10/04/18 08:03 Dose:  20 mg


Guaifenesin/Dextromethorphan (Robitussin Dm)  15 ml PO Q4H PRN


   PRN Reason: Cough


   Last Admin: 10/03/18 21:23 Dose:  15 ml


Heparin Sodium (Porcine) (Heparin)  5,000 units SC BID [facility]


   Last Admin: 10/04/18 08:03 Dose:  5,000 units


Lactated Ringer's (Lactated Ringer's)  1,000 mls @ 75 mls/hr IV .[id number] [facility]


   Last Admin: 10/03/18 21:22 Dose:  1,000 mls


Lithium Carbonate (Lithium Carbonate)  300 mg PO QAM-[facility]


   Last Admin: 10/04/18 08:02 Dose:  300 mg


Ondansetron HCl (Zofran)  4 mg IVP Q6H PRN


   PRN Reason: Nausea/Vomiting


Potassium Chloride (K-Dur)  40 meq PO QAM-[facility]


   Last Admin: 10/04/18 08:02 Dose:  40 meq


Rifaximin (Xifaxan)  550 mg PO BID [facility]


Risperidone (Risperidone)  3 mg PO HS [facility]


   Last Admin: 10/03/18 21:24 Dose:  3 mg


Sertraline HCl (Zoloft)  50 mg PO DAILY [facility]


   Last Admin: 10/04/18 08:02 Dose:  50 mg


Vancomycin HCl (First Vancomycin)  125 mg PO QID [facility]


   Last Admin: 10/04/18 08:02 Dose:  125 mg

## 2018-10-05 LAB
ALBUMIN SERPL BCG-MCNC: 2.8 G/DL (ref 3.5–5)
ANION GAP SERPL CALC-SCNC: 8 MMOL/L (ref 10–20)
BUN SERPL-MCNC: 12 MG/DL (ref 9.8–20.1)
BUN/CREAT SERPL: 19.05
CALCIUM SERPL-MCNC: 9.4 MG/DL (ref 7.8–10.44)
CHLORIDE SERPL-SCNC: 111 MMOL/L (ref 98–107)
CO2 SERPL-SCNC: 29 MMOL/L (ref 22–29)
CREAT CL PREDICTED SERPL C-G-VRATE: 89 ML/MIN (ref 70–130)
ELIA VACULITIS NEW METHOD: (no result)
GLUCOSE SERPL-MCNC: 95 MG/DL (ref 70–105)
HBSAG INDEX: 0.25 S/CO (ref 0–0.99)
HBV SURFACE AB SERPL IA-ACNC: 1.19 MIU/ML
HEP B CORE TOTAL INDEX: 0.06 S/CO (ref 0–0.79)
HEP C INDEX: 0.05 S/CO (ref 0–0.79)
IRON SERPL-MCNC: 93 UG/DL (ref 50–170)
MITOCHONDRIA M2 IGG SER-ACNC: (no result) U/ML
POTASSIUM SERPL-SCNC: 4.7 MMOL/L (ref 3.5–5.1)
SODIUM SERPL-SCNC: 143 MMOL/L (ref 136–145)
UIBC SERPL-MCNC: 204 MCG/DL (ref 265–497)

## 2018-10-05 RX ADMIN — VANCOMYCIN HYDROCHLORIDE SCH MG: KIT at 20:11

## 2018-10-05 RX ADMIN — HEPARIN SODIUM SCH UNITS: 5000 INJECTION, SOLUTION INTRAVENOUS; SUBCUTANEOUS at 08:17

## 2018-10-05 RX ADMIN — VANCOMYCIN HYDROCHLORIDE SCH MG: KIT at 17:56

## 2018-10-05 RX ADMIN — HEPARIN SODIUM SCH UNITS: 5000 INJECTION, SOLUTION INTRAVENOUS; SUBCUTANEOUS at 20:12

## 2018-10-05 RX ADMIN — DIVALPROEX SODIUM SCH MG: 250 TABLET, DELAYED RELEASE ORAL at 08:16

## 2018-10-05 RX ADMIN — VANCOMYCIN HYDROCHLORIDE SCH MG: KIT at 13:15

## 2018-10-05 RX ADMIN — VANCOMYCIN HYDROCHLORIDE SCH MG: KIT at 08:17

## 2018-10-05 NOTE — CON
DATE OF CONSULTATION:  10/04/2018

 

CHIEF COMPLAINT:  Diarrhea.

 

HISTORY OF PRESENT ILLNESS:  Ms. Borja is a 62-year-old woman who was admitted to the emergency
 room after she was found with altered mental status at home.  She is now awake and alert and able to
 give a history.  She states that she had diarrhea for a week before that.  She had been discharged f
rom the hospital back around 09/06/2018.  She did have some diffuse aching abdominal pain that went o
n for a few days but has since resolved with fluids and antibiotics.  She was found to have acute robbie
al failure on presentation, which is corrected with IV hydration.  She has no nausea, vomiting or abd
ominal pain.  She does continue to have diarrhea with about 5 liquidy stools per day over the last co
uple days.  Incidentally, on review of her imaging, she is noted to have a nodular liver and splenome
jeimy consistent with cirrhosis.  She states that she has never drank much alcohol but does have about
 one glass of alcohol per week.  Her urine was noted to be positive for cocaine on presentation, but 
she denies ever using any kind of illicit drugs in the past.

 

PAST MEDICAL HISTORY:  Bipolar disorder, COPD, hypertension, hyperlipidemia, diabetes mellitus, possi
ble schizophrenia.

 

PAST SURGICAL HISTORY:  Appendectomy, tonsillectomy, cholecystectomy, hysterectomy, skin cancer remov
ed.

 

FAMILY HISTORY:  Negative for GI malignancy or cirrhosis.

 

SOCIAL HISTORY:  She smokes half a pack a day.  She has 1 alcoholic drink per week.  Denies drug use 
despite the positive urine for cocaine on presentation.

 

ALLERGIES:  CODEINE, MORPHINE, PENICILLIN.

 

MEDICATIONS:  Currently include Depakote, famotidine, heparin subQ, lithium, rifaximin, risperidone, 
sertraline, vancomycin.

 

REVIEW OF SYSTEMS:  Negative x10 systems reviewed except as stated in history of present illness.

 

PHYSICAL EXAMINATION:

VITAL SIGNS:  Temperature 98.4, pulse 72, blood pressure 145/74, oxygen saturation 92% on room air.

GENERAL:  She is in no acute distress, alert and oriented x3.

HEENT:  Eyes have no scleral icterus.  Oropharynx is clear, without lesions.

NECK:  No cervical or supraclavicular lymphadenopathy.

LUNGS:  Clear to auscultation bilaterally.

HEART:  Regular rate and rhythm without murmur.

ABDOMEN:  Soft, nontender, nondistended.  Bowel sounds are present.

EXTREMITIES:  No lower extremity edema.

NEUROLOGIC:  Cranial nerves are grossly intact.  There is no asterixis on neurological exam.  She muse
s have a few spider angiomas on her upper chest.

 

LABORATORY DATA:  Creatinine is 0.73 down from 7.24, albumin is 2.6.  Ammonia 37, bilirubin 0.4, AST 
5, ALT 7, alkaline phosphatase 91.  TSH 3.4, lipase 32.

 

IMPRESSION:

1.  Clostridium difficile colitis.  She has had diarrhea and was admitted with severe dehydration and
 acute renal failure.  She has recent hospitalization.  Her stool was positive for toxigenic C. diff 
by PCR.

2.  Cirrhosis of the liver.  She denies significant prior alcohol intake; however, she also denies dr
ug use despite the positive cocaine on tox screen.  We will need to work this up further.

 

RECOMMENDATIONS:

1.  Vancomycin 125 mg 4 times daily for 14 days.

2.  Check viral hepatitis panel, iron saturation, alpha 1 antitrypsin level, smooth muscle antibody, 
mitochondrial antibody.

3.  Check alpha fetoprotein.  She will need to follow up in the office for hepatoma screening and wilbert
ices screening.  She will require EGD for that.

4.  She is also recommended to follow up in the office for screening colonoscopy after treatment of h
er C. diff.

## 2018-10-05 NOTE — PRG
DATE OF SERVICE:  10/05/2018

 

SUBJECTIVE:  Ms. Borja is still having diarrhea today.  She has no abdominal pain or nausea.  S
he is tolerating a solid diet well.

 

OBJECTIVE:

VITAL SIGNS:  Temperature 98.3, pulse 91, blood pressure 147/73.

GENERAL:  She is in no acute distress.  She is awake and alert.

LUNGS:  Clear to auscultation bilaterally.

HEART:  Regular rate and rhythm.

ABDOMEN:  Soft, nontender, nondistended.  Bowel sounds are present.

EXTREMITIES:  No lower extremity edema.

 

LABORATORY DATA:  White blood cell count 10.6 back on 10/01/2018.  Creatinine 0.63, albumin 2.8.  Vir
al hepatitis screen is negative.  Mitochondrial antibody is negative.  Iron saturation is not elevate
d, alpha fetoprotein is 7.9.

 

IMPRESSION:

1.  Clostridium difficile colitis.  She does not have abdominal pain or distention.  Her acute renal 
failure is improved.

2.  Cirrhosis of the liver.  Initial lab work has been initiated and she will need to follow up in GI
 Clinic for further workup of this.

 

RECOMMENDATIONS:

1.  Continue vancomycin 125 mg 4 times daily for a 14-day course.

2.  She should undergo screening colonoscopy after resolution of her C. difficile colitis as an outpa
tient in the future.  Upper endoscopy can be performed at the same time for varices screening.

3.  Once her diarrhea is under better control, she should discharge back to the nursing home.  She sh
ould follow up in GI Clinic in around 4 weeks.

4.  I will sign off.  Please call if GI can be of assistance.

## 2018-10-05 NOTE — PDOC.PN
- Subjective


Encounter Start Date: 10/05/18


Encounter Start Time: 11:00


Subjective: still has lots of diarrhea, no abd pain or fever


-: is tolerating oral diet and drinking fluids





- Objective


Resuscitation Status: 


 











Resuscitation Status           FULL:Full Resuscitation














MAR Reviewed: Yes


Vital Signs & Weight: 


 Vital Signs (12 hours)











  Temp Pulse Resp BP Pulse Ox


 


 10/05/18 08:17      93 L


 


 10/05/18 08:00  98.3 F  91  16  147/73 H  93 L








 Weight











Admit Weight                   129 lb 4.8 oz


 


Weight                         131 lb 8 oz














I&O: 


 











 10/04/18 10/05/18 10/06/18





 06:59 06:59 06:59


 


Intake Total 3300 4402 


 


Output Total 3522 5692 


 


Balance -225 -1223 











Result Diagrams: 


 10/01/18 03:55





 10/05/18 04:28


Additional Labs: 


 Accuchecks











  10/05/18





  04:43


 


POC Glucose  89














Phys Exam





- Physical Examination


HEENT: PERRLA, moist MMs


Neck: no JVD, supple


Respiratory: no wheezing, no rales


Cardiovascular: RRR, no significant murmur


Gastrointestinal: soft, non-tender, no distention, positive bowel sounds


Musculoskeletal: no edema, pulses present


Neurological: non-focal, moves all 4 limbs


Psychiatric: A&O x 3





Dx/Plan


(1) Clostridium difficile colitis


Status: Acute   





(2) Acute renal failure


Status: Resolved   


Qualifiers: 


   Acute renal failure type: unspecified   Qualified Code(s): N17.9 - Acute 

kidney failure, unspecified   





(3) Metabolic acidosis


Code(s): E87.2 - ACIDOSIS   Status: Resolved   





(4) Gastroenteritis


Code(s): K52.9 - NONINFECTIVE GASTROENTERITIS AND COLITIS, UNSPECIFIED   Status

: Acute   Comment: sec to c.diff colitis   





(5) Acute encephalopathy


Code(s): G93.40 - ENCEPHALOPATHY, UNSPECIFIED   Status: Acute   Comment: 

resolving   





(6) Substance abuse


Code(s): F19.10 - OTHER PSYCHOACTIVE SUBSTANCE ABUSE, UNCOMPLICATED   Status: 

Acute   Comment: pcp and cocaine, but denies   





(7) Bipolar disorder


Code(s): F31.9 - BIPOLAR DISORDER, UNSPECIFIED   Status: Chronic   


Qualifiers: 


   Active/Remission status: remission status unspecified   Qualified Code(s): 

F31.9 - Bipolar disorder, unspecified   





(8) Hypertension


Code(s): I10 - ESSENTIAL (PRIMARY) HYPERTENSION   Status: Chronic   


Qualifiers: 


   Hypertension type: essential hypertension 





(9) Tobacco abuse


Code(s): Z72.0 - TOBACCO USE   Status: Chronic   





- Plan


still has a lot of watery to semisolid stools


-: agrees to have rectal tube placed which will help buttock ulcers to heel as


-: on vanc po and xifaxan


-: continue ringer lactate until diarrhea holds up


-: to mobilize with PT as tolerated





* .








Review of Systems





- Medications/Allergies


Allergies/Adverse Reactions: 


 Allergies











Allergy/AdvReac Type Severity Reaction Status Date / Time


 


codeine Allergy   Verified 09/30/18 22:48


 


morphine Allergy   Verified 09/30/18 22:48


 


Penicillins Allergy   Verified 09/30/18 22:48











Medications: 


 Current Medications





Acetaminophen (Tylenol)  650 mg PO Q4H PRN


   PRN Reason: Headache/Fever or Pain


Divalproex Sodium (Depakote)  500 mg PO HS [facility]


   Last Admin: 10/04/18 22:08 Dose:  500 mg


Divalproex Sodium (Depakote)  250 mg PO DAILY [facility]


   Last Admin: 10/05/18 08:16 Dose:  250 mg


Famotidine (Pepcid)  20 mg PO DAILY [facility]


   Last Admin: 10/05/18 08:16 Dose:  20 mg


Guaifenesin/Dextromethorphan (Robitussin Dm)  15 ml PO Q4H PRN


   PRN Reason: Cough


   Last Admin: 10/03/18 21:23 Dose:  15 ml


Heparin Sodium (Porcine) (Heparin)  5,000 units SC BID [facility]


   Last Admin: 10/05/18 08:17 Dose:  5,000 units


Lactated Ringer's (Lactated Ringer's)  1,000 mls @ 75 mls/hr IV .[id number] [facility]


   Last Admin: 10/05/18 05:18 Dose:  1,000 mls


Lithium Carbonate (Lithium Carbonate)  300 mg PO QAM-[facility]


   Last Admin: 10/05/18 08:15 Dose:  300 mg


Ondansetron HCl (Zofran)  4 mg IVP Q6H PRN


   PRN Reason: Nausea/Vomiting


Potassium Chloride (K-Dur)  40 meq PO QAM-[facility]


   Last Admin: 10/05/18 08:15 Dose:  40 meq


Rifaximin (Xifaxan)  550 mg PO BID [facility]


   Last Admin: 10/05/18 08:16 Dose:  550 mg


Risperidone (Risperidone)  3 mg PO HS [facility]


   Last Admin: 10/04/18 22:08 Dose:  3 mg


Sertraline HCl (Zoloft)  50 mg PO DAILY [facility]


   Last Admin: 10/05/18 08:16 Dose:  50 mg


Vancomycin HCl (First Vancomycin)  125 mg PO QID [facility]


   Last Admin: 10/05/18 08:17 Dose:  125 mg

## 2018-10-06 LAB
ALBUMIN SERPL BCG-MCNC: 2.8 G/DL (ref 3.5–5)
ANION GAP SERPL CALC-SCNC: 12 MMOL/L (ref 10–20)
BUN SERPL-MCNC: 7 MG/DL (ref 9.8–20.1)
BUN/CREAT SERPL: 11.48
CALCIUM SERPL-MCNC: 9.1 MG/DL (ref 7.8–10.44)
CHLORIDE SERPL-SCNC: 106 MMOL/L (ref 98–107)
CO2 SERPL-SCNC: 29 MMOL/L (ref 22–29)
CREAT CL PREDICTED SERPL C-G-VRATE: 92 ML/MIN (ref 70–130)
GLUCOSE SERPL-MCNC: 87 MG/DL (ref 70–105)
POTASSIUM SERPL-SCNC: 4.8 MMOL/L (ref 3.5–5.1)
SODIUM SERPL-SCNC: 142 MMOL/L (ref 136–145)

## 2018-10-06 RX ADMIN — VANCOMYCIN HYDROCHLORIDE SCH MG: KIT at 20:00

## 2018-10-06 RX ADMIN — HEPARIN SODIUM SCH UNITS: 5000 INJECTION, SOLUTION INTRAVENOUS; SUBCUTANEOUS at 20:00

## 2018-10-06 RX ADMIN — VANCOMYCIN HYDROCHLORIDE SCH MG: KIT at 08:34

## 2018-10-06 RX ADMIN — DIVALPROEX SODIUM SCH MG: 500 TABLET, DELAYED RELEASE ORAL at 20:00

## 2018-10-06 RX ADMIN — HEPARIN SODIUM SCH UNITS: 5000 INJECTION, SOLUTION INTRAVENOUS; SUBCUTANEOUS at 08:32

## 2018-10-06 RX ADMIN — VANCOMYCIN HYDROCHLORIDE SCH MG: KIT at 12:49

## 2018-10-06 RX ADMIN — VANCOMYCIN HYDROCHLORIDE SCH MG: KIT at 16:50

## 2018-10-06 RX ADMIN — DIVALPROEX SODIUM SCH MG: 250 TABLET, DELAYED RELEASE ORAL at 08:33

## 2018-10-06 NOTE — PDOC.PN
- Subjective


Encounter Start Date: 10/06/18


Encounter Start Time: 12:20


Subjective: no abd pain or nausea


-: still having diarrhea, is tolerating oral diet





- Objective


Resuscitation Status: 


 











Resuscitation Status           FULL:Full Resuscitation














MAR Reviewed: Yes


Vital Signs & Weight: 


 Vital Signs (12 hours)











  Temp Pulse Resp BP Pulse Ox


 


 10/06/18 08:26      92 L


 


 10/06/18 07:20  97.4 F L  86  18  156/73 H  91 L








 Weight











Admit Weight                   129 lb 4.8 oz


 


Weight                         131 lb 8 oz














I&O: 


 











 10/05/18 10/06/18 10/07/18





 06:59 06:59 06:59


 


Intake Total 4402 3960 


 


Output Total 5669 6779 5653


 


Balance -1929 -843 -6247











Result Diagrams: 


 10/01/18 03:55





 10/06/18 03:53





Phys Exam





- Physical Examination


HEENT: PERRLA, moist MMs


Neck: no JVD, supple


Respiratory: no wheezing, no rales


Cardiovascular: RRR, no significant murmur


Gastrointestinal: soft, non-tender, no distention, positive bowel sounds


Musculoskeletal: no edema, pulses present


Neurological: non-focal, moves all 4 limbs


Psychiatric: normal affect, A&O x 3





Dx/Plan


(1) Clostridium difficile colitis


Status: Acute   





(2) Acute renal failure


Status: Resolved   


Qualifiers: 


   Acute renal failure type: unspecified   Qualified Code(s): N17.9 - Acute 

kidney failure, unspecified   





(3) Metabolic acidosis


Code(s): E87.2 - ACIDOSIS   Status: Resolved   





(4) Gastroenteritis


Code(s): K52.9 - NONINFECTIVE GASTROENTERITIS AND COLITIS, UNSPECIFIED   Status

: Acute   Comment: sec to c.diff colitis   





(5) Acute encephalopathy


Code(s): G93.40 - ENCEPHALOPATHY, UNSPECIFIED   Status: Acute   Comment: 

resolving   





(6) Substance abuse


Code(s): F19.10 - OTHER PSYCHOACTIVE SUBSTANCE ABUSE, UNCOMPLICATED   Status: 

Acute   Comment: pcp and cocaine, but denies   





(7) Bipolar disorder


Code(s): F31.9 - BIPOLAR DISORDER, UNSPECIFIED   Status: Chronic   


Qualifiers: 


   Active/Remission status: remission status unspecified   Qualified Code(s): 

F31.9 - Bipolar disorder, unspecified   





(8) Hypertension


Code(s): I10 - ESSENTIAL (PRIMARY) HYPERTENSION   Status: Chronic   


Qualifiers: 


   Hypertension type: essential hypertension 





(9) Tobacco abuse


Code(s): Z72.0 - TOBACCO USE   Status: Chronic   





- Plan


cdiff is slowly resolving


-: on oral vanc and xifaxan


-: continue iv hydration till diarrhea resolves


-: freq of diarrhea appears to be coming down from yesterday


-: is amb with PT, dc plan when diarrhea freq comes down





* .








Review of Systems





- Medications/Allergies


Allergies/Adverse Reactions: 


 Allergies











Allergy/AdvReac Type Severity Reaction Status Date / Time


 


codeine Allergy   Verified 09/30/18 22:48


 


morphine Allergy   Verified 09/30/18 22:48


 


Penicillins Allergy   Verified 09/30/18 22:48











Medications: 


 Current Medications





Acetaminophen (Tylenol)  650 mg PO Q4H PRN


   PRN Reason: Headache/Fever or Pain


Divalproex Sodium (Depakote)  500 mg PO HS [facility]


   Last Admin: 10/04/18 22:08 Dose:  500 mg


Divalproex Sodium (Depakote)  250 mg PO DAILY [facility]


   Last Admin: 10/06/18 08:33 Dose:  250 mg


Famotidine (Pepcid)  20 mg PO DAILY [facility]


   Last Admin: 10/06/18 08:34 Dose:  20 mg


Guaifenesin/Dextromethorphan (Robitussin Dm)  15 ml PO Q4H PRN


   PRN Reason: Cough


   Last Admin: 10/03/18 21:23 Dose:  15 ml


Heparin Sodium (Porcine) (Heparin)  5,000 units SC BID [facility]


   Last Admin: 10/06/18 08:32 Dose:  5,000 units


Lactated Ringer's (Lactated Ringer's)  1,000 mls @ 75 mls/hr IV .[id number] [facility]


   Last Admin: 10/06/18 05:45 Dose:  1,000 mls


Lithium Carbonate (Lithium Carbonate)  300 mg PO QAM-[facility]


   Last Admin: 10/06/18 08:33 Dose:  300 mg


Ondansetron HCl (Zofran)  4 mg IVP Q6H PRN


   PRN Reason: Nausea/Vomiting


Potassium Chloride (K-Dur)  40 meq PO QAM-[facility]


   Last Admin: 10/06/18 08:32 Dose:  40 meq


Rifaximin (Xifaxan)  550 mg PO BID [facility]


   Last Admin: 10/06/18 08:34 Dose:  550 mg


Risperidone (Risperidone)  3 mg PO [facility]


   Last Admin: 10/05/18 20:12 Dose:  3 mg


Sertraline HCl (Zoloft)  50 mg PO DAILY [facility]


   Last Admin: 10/06/18 08:33 Dose:  50 mg


Vancomycin HCl (First Vancomycin)  125 mg PO QID [facility]


   Last Admin: 10/06/18 12:49 Dose:  125 mg

## 2018-10-07 LAB
ALBUMIN SERPL BCG-MCNC: 2.7 G/DL (ref 3.5–5)
ANION GAP SERPL CALC-SCNC: 14 MMOL/L (ref 10–20)
BUN SERPL-MCNC: 9 MG/DL (ref 9.8–20.1)
BUN/CREAT SERPL: 15.52
CALCIUM SERPL-MCNC: 8.8 MG/DL (ref 7.8–10.44)
CHLORIDE SERPL-SCNC: 104 MMOL/L (ref 98–107)
CO2 SERPL-SCNC: 28 MMOL/L (ref 22–29)
CREAT CL PREDICTED SERPL C-G-VRATE: 97 ML/MIN (ref 70–130)
GLUCOSE SERPL-MCNC: 96 MG/DL (ref 70–105)
POTASSIUM SERPL-SCNC: 4.6 MMOL/L (ref 3.5–5.1)
SODIUM SERPL-SCNC: 141 MMOL/L (ref 136–145)

## 2018-10-07 RX ADMIN — DIVALPROEX SODIUM SCH MG: 250 TABLET, DELAYED RELEASE ORAL at 08:28

## 2018-10-07 RX ADMIN — VANCOMYCIN HYDROCHLORIDE SCH MG: KIT at 21:05

## 2018-10-07 RX ADMIN — VANCOMYCIN HYDROCHLORIDE SCH MG: KIT at 08:27

## 2018-10-07 RX ADMIN — HEPARIN SODIUM SCH UNITS: 5000 INJECTION, SOLUTION INTRAVENOUS; SUBCUTANEOUS at 08:28

## 2018-10-07 RX ADMIN — VANCOMYCIN HYDROCHLORIDE SCH MG: KIT at 12:11

## 2018-10-07 RX ADMIN — HEPARIN SODIUM SCH UNITS: 5000 INJECTION, SOLUTION INTRAVENOUS; SUBCUTANEOUS at 21:05

## 2018-10-07 RX ADMIN — DIVALPROEX SODIUM SCH MG: 500 TABLET, DELAYED RELEASE ORAL at 21:05

## 2018-10-07 RX ADMIN — VANCOMYCIN HYDROCHLORIDE SCH MG: KIT at 17:17

## 2018-10-07 NOTE — PDOC.PN
- Subjective


Encounter Start Date: 10/07/18


Encounter Start Time: 10:00


Subjective: diarrhea is getting better, no abd pain


-: is eating well


-: ambulating in hallway, overall is feeling better





- Objective


Resuscitation Status: 


 











Resuscitation Status           FULL:Full Resuscitation














MAR Reviewed: Yes


Vital Signs & Weight: 


 Vital Signs (12 hours)











  Temp Pulse Resp BP Pulse Ox


 


 10/07/18 07:14  99.9 F H  97  19  143/75 H  96








 Weight











Admit Weight                   129 lb 4.8 oz


 


Weight                         131 lb 8 oz














I&O: 


 











 10/06/18 10/07/18 10/08/18





 06:59 06:59 06:59


 


Intake Total 3960 4425 


 


Output Total 4609 4432 3420


 


Balance -069 -3168 -7208











Result Diagrams: 


 10/01/18 03:55





 10/07/18 04:03





Phys Exam





- Physical Examination


HEENT: PERRLA, moist MMs


Neck: no JVD, supple


Respiratory: no wheezing, no rales


Cardiovascular: RRR, no significant murmur


Gastrointestinal: soft, non-tender, no distention, positive bowel sounds


Musculoskeletal: no edema, pulses present


Neurological: non-focal, moves all 4 limbs


Psychiatric: normal affect, A&O x 3





Dx/Plan


(1) Clostridium difficile colitis


Status: Acute   





(2) Acute renal failure


Status: Resolved   


Qualifiers: 


   Acute renal failure type: unspecified   Qualified Code(s): N17.9 - Acute 

kidney failure, unspecified   





(3) Metabolic acidosis


Code(s): E87.2 - ACIDOSIS   Status: Resolved   





(4) Gastroenteritis


Code(s): K52.9 - NONINFECTIVE GASTROENTERITIS AND COLITIS, UNSPECIFIED   Status

: Acute   Comment: sec to c.diff colitis   





(5) Acute encephalopathy


Code(s): G93.40 - ENCEPHALOPATHY, UNSPECIFIED   Status: Acute   Comment: 

resolving   





(6) Substance abuse


Code(s): F19.10 - OTHER PSYCHOACTIVE SUBSTANCE ABUSE, UNCOMPLICATED   Status: 

Acute   Comment: pcp and cocaine, but denies   





(7) Bipolar disorder


Code(s): F31.9 - BIPOLAR DISORDER, UNSPECIFIED   Status: Chronic   


Qualifiers: 


   Active/Remission status: remission status unspecified   Qualified Code(s): 

F31.9 - Bipolar disorder, unspecified   





(8) Hypertension


Code(s): I10 - ESSENTIAL (PRIMARY) HYPERTENSION   Status: Chronic   


Qualifiers: 


   Hypertension type: essential hypertension 





(9) Tobacco abuse


Code(s): Z72.0 - TOBACCO USE   Status: Chronic   





- Plan


is on oral vanc and xifaxan


-: dc iv fluids after current bag


-: is recovering well, ambulating in hallway


-: continue lithium and risperdal 


-: stool freq is slowly coming down





* .








Review of Systems





- Medications/Allergies


Allergies/Adverse Reactions: 


 Allergies











Allergy/AdvReac Type Severity Reaction Status Date / Time


 


codeine Allergy   Verified 09/30/18 22:48


 


morphine Allergy   Verified 09/30/18 22:48


 


Penicillins Allergy   Verified 09/30/18 22:48











Medications: 


 Current Medications





Acetaminophen (Tylenol)  650 mg PO Q4H PRN


   PRN Reason: Headache/Fever or Pain


Divalproex Sodium (Depakote)  500 mg PO [facility]


   Last Admin: 10/06/18 20:00 Dose:  500 mg


Divalproex Sodium (Depakote)  250 mg PO DAILY [facility]


   Last Admin: 10/07/18 08:28 Dose:  250 mg


Famotidine (Pepcid)  20 mg PO DAILY [facility]


   Last Admin: 10/07/18 08:31 Dose:  20 mg


Guaifenesin/Dextromethorphan (Robitussin Dm)  15 ml PO Q4H PRN


   PRN Reason: Cough


   Last Admin: 10/03/18 21:23 Dose:  15 ml


Heparin Sodium (Porcine) (Heparin)  5,000 units SC BID [facility]


   Last Admin: 10/07/18 08:28 Dose:  5,000 units


Lactated Ringer's (Lactated Ringer's)  1,000 mls @ 75 mls/hr IV .[id number] [facility]


   Last Admin: 10/07/18 05:56 Dose:  1,000 mls


Lithium Carbonate (Lithium Carbonate)  300 mg PO QA-[facility]


   Last Admin: 10/07/18 08:30 Dose:  300 mg


Ondansetron HCl (Zofran)  4 mg IVP Q6H PRN


   PRN Reason: Nausea/Vomiting


Potassium Chloride (K-Dur)  40 meq PO QAM-[facility]


   Last Admin: 10/07/18 08:29 Dose:  40 meq


Rifaximin (Xifaxan)  550 mg PO BID [facility]


   Last Admin: 10/07/18 08:28 Dose:  550 mg


Risperidone (Risperidone)  3 mg PO [facility]


   Last Admin: 10/06/18 20:00 Dose:  3 mg


Sertraline HCl (Zoloft)  50 mg PO DAILY [facility]


   Last Admin: 10/07/18 08:29 Dose:  50 mg


Vancomycin HCl (First Vancomycin)  125 mg PO QID [facility]


   Last Admin: 10/07/18 08:27 Dose:  125 mg

## 2018-10-08 LAB
ACTIN IGG SERPL-ACNC: 5 UNITS (ref 0–19)
ALBUMIN SERPL BCG-MCNC: 2.9 G/DL (ref 3.5–5)
ANION GAP SERPL CALC-SCNC: 12 MMOL/L (ref 10–20)
BUN SERPL-MCNC: 9 MG/DL (ref 9.8–20.1)
BUN/CREAT SERPL: 14.75
CALCIUM SERPL-MCNC: 9.4 MG/DL (ref 7.8–10.44)
CHLORIDE SERPL-SCNC: 106 MMOL/L (ref 98–107)
CO2 SERPL-SCNC: 29 MMOL/L (ref 22–29)
CREAT CL PREDICTED SERPL C-G-VRATE: 92 ML/MIN (ref 70–130)
GLUCOSE SERPL-MCNC: 103 MG/DL (ref 70–105)
POTASSIUM SERPL-SCNC: 4.4 MMOL/L (ref 3.5–5.1)
SODIUM SERPL-SCNC: 143 MMOL/L (ref 136–145)

## 2018-10-08 RX ADMIN — DIVALPROEX SODIUM SCH MG: 250 TABLET, DELAYED RELEASE ORAL at 08:28

## 2018-10-08 RX ADMIN — HEPARIN SODIUM SCH UNITS: 5000 INJECTION, SOLUTION INTRAVENOUS; SUBCUTANEOUS at 08:30

## 2018-10-08 RX ADMIN — VANCOMYCIN HYDROCHLORIDE SCH MG: KIT at 21:42

## 2018-10-08 RX ADMIN — DIVALPROEX SODIUM SCH MG: 500 TABLET, DELAYED RELEASE ORAL at 21:41

## 2018-10-08 RX ADMIN — VANCOMYCIN HYDROCHLORIDE SCH MG: KIT at 16:48

## 2018-10-08 RX ADMIN — VANCOMYCIN HYDROCHLORIDE SCH MG: KIT at 08:27

## 2018-10-08 RX ADMIN — HEPARIN SODIUM SCH UNITS: 5000 INJECTION, SOLUTION INTRAVENOUS; SUBCUTANEOUS at 21:42

## 2018-10-08 RX ADMIN — VANCOMYCIN HYDROCHLORIDE SCH MG: KIT at 12:49

## 2018-10-08 NOTE — PDOC.PN
- Subjective


Encounter Start Date: 10/08/18


Encounter Start Time: 10:30


Subjective: still having diarrhea, had 3 bm's from am per patient


-: no sob or abd pain or nausea





- Objective


Resuscitation Status: 


 











Resuscitation Status           FULL:Full Resuscitation














MAR Reviewed: Yes


Vital Signs & Weight: 


 Vital Signs (12 hours)











  Temp Pulse Resp BP Pulse Ox


 


 10/08/18 08:00  99.3 F  83  19  116/53 L  100








 Weight











Admit Weight                   129 lb 4.8 oz


 


Weight                         131 lb 8 oz














I&O: 


 











 10/07/18 10/08/18 10/09/18





 06:59 06:59 06:59


 


Intake Total 4425 5170 740


 


Output Total 5650 5300 


 


Balance -1225 -130 740











Result Diagrams: 


 10/01/18 03:55





 10/08/18 04:35





Phys Exam





- Physical Examination


HEENT: PERRLA, moist MMs


Neck: no JVD, supple


Respiratory: no wheezing, no rales


Cardiovascular: RRR, no significant murmur


Gastrointestinal: soft, non-tender, positive bowel sounds


Musculoskeletal: no edema, pulses present


Neurological: non-focal, moves all 4 limbs


Psychiatric: A&O x 3





Dx/Plan


(1) Clostridium difficile colitis


Status: Acute   





(2) Acute renal failure


Status: Resolved   


Qualifiers: 


   Acute renal failure type: unspecified   Qualified Code(s): N17.9 - Acute 

kidney failure, unspecified   





(3) Metabolic acidosis


Code(s): E87.2 - ACIDOSIS   Status: Resolved   





(4) Gastroenteritis


Code(s): K52.9 - NONINFECTIVE GASTROENTERITIS AND COLITIS, UNSPECIFIED   Status

: Acute   Comment: sec to c.diff colitis   





(5) Acute encephalopathy


Code(s): G93.40 - ENCEPHALOPATHY, UNSPECIFIED   Status: Acute   Comment: 

resolving   





(6) Substance abuse


Code(s): F19.10 - OTHER PSYCHOACTIVE SUBSTANCE ABUSE, UNCOMPLICATED   Status: 

Acute   Comment: pcp and cocaine, but denies   





(7) Bipolar disorder


Code(s): F31.9 - BIPOLAR DISORDER, UNSPECIFIED   Status: Chronic   


Qualifiers: 


   Active/Remission status: remission status unspecified   Qualified Code(s): 

F31.9 - Bipolar disorder, unspecified   





(8) Hypertension


Code(s): I10 - ESSENTIAL (PRIMARY) HYPERTENSION   Status: Chronic   


Qualifiers: 


   Hypertension type: essential hypertension 





(9) Tobacco abuse


Code(s): Z72.0 - TOBACCO USE   Status: Chronic   





- Plan


is on van po and xifaxan


-: has severe cdiff colitis and is slowly responding to treatment


-: is amb in hallway now, dc iv fluids


-: tolerating oral diet, continue lithium and risperdal


-: dc plan when freq of diarrhea gets better





* .








Review of Systems





- Medications/Allergies


Allergies/Adverse Reactions: 


 Allergies











Allergy/AdvReac Type Severity Reaction Status Date / Time


 


codeine Allergy   Verified 09/30/18 22:48


 


morphine Allergy   Verified 09/30/18 22:48


 


Penicillins Allergy   Verified 09/30/18 22:48











Medications: 


 Current Medications





Acetaminophen (Tylenol)  650 mg PO Q4H PRN


   PRN Reason: Headache/Fever or Pain


Divalproex Sodium (Depakote)  500 mg PO HS [facility]


   Last Admin: 10/07/18 21:05 Dose:  500 mg


Divalproex Sodium (Depakote)  250 mg PO DAILY [facility]


   Last Admin: 10/08/18 08:28 Dose:  250 mg


Famotidine (Pepcid)  20 mg PO DAILY [facility]


   Last Admin: 10/08/18 08:29 Dose:  20 mg


Guaifenesin/Dextromethorphan (Robitussin Dm)  15 ml PO Q4H PRN


   PRN Reason: Cough


   Last Admin: 10/03/18 21:23 Dose:  15 ml


Heparin Sodium (Porcine) (Heparin)  5,000 units SC BID [facility]


   Last Admin: 10/08/18 08:30 Dose:  5,000 units


Lithium Carbonate (Lithium Carbonate)  300 mg PO QAM-[facility]


   Last Admin: 10/08/18 08:29 Dose:  300 mg


Ondansetron HCl (Zofran)  4 mg IVP Q6H PRN


   PRN Reason: Nausea/Vomiting


Potassium Chloride (K-Dur)  40 meq PO QAM-[facility]


   Last Admin: 10/08/18 08:30 Dose:  40 meq


Rifaximin (Xifaxan)  550 mg PO BID [facility]


   Last Admin: 10/08/18 08:29 Dose:  550 mg


Risperidone (Risperidone)  3 mg PO HS [facility]


   Last Admin: 10/07/18 21:05 Dose:  3 mg


Sertraline HCl (Zoloft)  50 mg PO DAILY [facility]


   Last Admin: 10/08/18 08:27 Dose:  50 mg


Vancomycin HCl (First Vancomycin)  125 mg PO QID [facility]


   Last Admin: 10/08/18 08:27 Dose:  125 mg

## 2018-10-09 LAB
ALBUMIN SERPL BCG-MCNC: 3.1 G/DL (ref 3.5–5)
ANION GAP SERPL CALC-SCNC: 12 MMOL/L (ref 10–20)
BUN SERPL-MCNC: 14 MG/DL (ref 9.8–20.1)
BUN/CREAT SERPL: 17.95
CALCIUM SERPL-MCNC: 10 MG/DL (ref 7.8–10.44)
CHLORIDE SERPL-SCNC: 104 MMOL/L (ref 98–107)
CO2 SERPL-SCNC: 29 MMOL/L (ref 22–29)
CREAT CL PREDICTED SERPL C-G-VRATE: 72 ML/MIN (ref 70–130)
GLUCOSE SERPL-MCNC: 138 MG/DL (ref 70–105)
POTASSIUM SERPL-SCNC: 4.7 MMOL/L (ref 3.5–5.1)
SODIUM SERPL-SCNC: 140 MMOL/L (ref 136–145)

## 2018-10-09 RX ADMIN — VANCOMYCIN HYDROCHLORIDE SCH MG: KIT at 08:26

## 2018-10-09 RX ADMIN — VANCOMYCIN HYDROCHLORIDE SCH MG: KIT at 12:23

## 2018-10-09 RX ADMIN — HEPARIN SODIUM SCH UNITS: 5000 INJECTION, SOLUTION INTRAVENOUS; SUBCUTANEOUS at 08:26

## 2018-10-09 RX ADMIN — DIVALPROEX SODIUM SCH MG: 250 TABLET, DELAYED RELEASE ORAL at 08:26

## 2018-10-09 RX ADMIN — VANCOMYCIN HYDROCHLORIDE SCH MG: KIT at 16:07

## 2018-10-09 RX ADMIN — VANCOMYCIN HYDROCHLORIDE SCH MG: KIT at 21:03

## 2018-10-09 RX ADMIN — HEPARIN SODIUM SCH UNITS: 5000 INJECTION, SOLUTION INTRAVENOUS; SUBCUTANEOUS at 20:56

## 2018-10-09 RX ADMIN — DIVALPROEX SODIUM SCH MG: 500 TABLET, DELAYED RELEASE ORAL at 20:56

## 2018-10-09 NOTE — PDOC.PN
- Subjective


Encounter Start Date: 10/09/18


Encounter Start Time: 14:17


Subjective: 3-4 loose stools yesterday


-: no abd pain.no N/V





- Objective


Resuscitation Status: 


 











Resuscitation Status           FULL:Full Resuscitation














MAR Reviewed: Yes


Vital Signs & Weight: 


 Vital Signs (12 hours)











  Temp Pulse Resp BP Pulse Ox


 


 10/09/18 08:00  97.7 F  95  20  125/66  92 L








 Weight











Admit Weight                   129 lb 4.8 oz


 


Weight                         131 lb 8 oz














I&O: 


 











 10/08/18 10/09/18 10/10/18





 06:59 06:59 06:59


 


Intake Total 5170 3500 


 


Output Total 5300 4150 


 


Balance -130 -650 











Result Diagrams: 


 10/01/18 03:55





 10/09/18 04:09


Additional Labs: 





Microbiology





10/01/18 00:45   Urine lemus catheter   Urine Culture - Final


                              NO GROWTH AT 36 HOURS


10/01/18 00:45   Stool - Liquid   Stool Culture - Final


10/01/18 00:45   Stool - Liquid   Escherichia coli 0157 Culture - Final


10/01/18 00:45   Stool - Liquid   Campylobacter Antigen Assay - Final


10/01/18 00:45   Stool - Liquid   Shiga Toxin Test - Final


10/01/18 00:45   Stool   C. difficile GDH Antigen & Toxins - Final


10/01/18 00:45   Stool   Clostridium difficile Toxin A&B PCR - Final


09/30/18 20:16   Venous blood - Right Arm   Blood Culture - Final


                              NO GROWTH IN 5 DAYS


09/30/18 18:16   Venous blood - Left Hand   Blood Culture - Final


                              NO GROWTH IN 5 DAYS





 Laboratory Tests











  09/30/18 10/05/18 10/05/18





  19:30 04:28 04:28


 


Ur Phencyclidine Scrn  Detected H  


 


U Cocaine Metab Screen  Detected H  


 


Hepatitis A IgM Ab    Negative


 


Hepatitis A Ab Total    Negative


 


Hep Bs Antigen   Non-Reactive 


 


Hep Bs Antibody   Non-Reactive 


 


Hep Bs Antibody Index   1.19 


 


Hep B Core Total Ab   Non-Reactive 


 


Hepatitis C Antibody   Non-Reactive 














Phys Exam





- Physical Examination


Constitutional: NAD


HEENT: PERRLA, moist MMs, sclera anicteric, oral pharynx no lesions


Neck: no nodes, no JVD, supple, full ROM


Respiratory: no wheezing, no rales, no rhonchi, clear to auscultation bilateral


Cardiovascular: RRR, no significant murmur, no rub


Gastrointestinal: soft, non-tender, no distention, positive bowel sounds


Musculoskeletal: no edema, pulses present


Neurological: non-focal, normal sensation, moves all 4 limbs


Psychiatric: normal affect, A&O x 3


Skin: no rash





Dx/Plan


(1) Clostridium difficile colitis


Status: Acute   





(2) Bipolar disorder


Code(s): F31.9 - BIPOLAR DISORDER, UNSPECIFIED   Status: Chronic   


Qualifiers: 


   Active/Remission status: remission status unspecified   Qualified Code(s): 

F31.9 - Bipolar disorder, unspecified   





(3) Dyslipidemia


Code(s): E78.5 - HYPERLIPIDEMIA, UNSPECIFIED   Status: Chronic   





(4) Hypertension


Code(s): I10 - ESSENTIAL (PRIMARY) HYPERTENSION   Status: Chronic   


Qualifiers: 


   Hypertension type: essential hypertension 





(5) Tobacco abuse


Code(s): Z72.0 - TOBACCO USE   Status: Chronic   





(6) Substance abuse


Code(s): F19.10 - OTHER PSYCHOACTIVE SUBSTANCE ABUSE, UNCOMPLICATED   Status: 

Chronic   Comment: pcp and cocaine, but denies   





(7) Acute renal failure


Status: Resolved   


Qualifiers: 


   Acute renal failure type: unspecified   Qualified Code(s): N17.9 - Acute 

kidney failure, unspecified   





- Plan


out of bed/ambulate, DVT proph w/SCDs


cont PO vancomycin.add pro biotics


-: nataliya Louise to [facility] tomorrow am if Bm frequency same or less


-: ambulate .OT/PT.


-: hemdynamically stable.


-: OP f/u w GI





* .








Review of Systems





- Review of Systems


Constitutional: negative: fever, chills, sweats, weakness, malaise, other


ENT: negative: Ear Pain, Ear Discharge, Nose Pain, Nose Discharge, Nose 

Congestion, Mouth Pain, Mouth Swelling, Throat Pain, Throat Swelling, Other


Respiratory: negative: Cough, Dry, Shortness of Breath, Hemoptysis, SOB with 

Excertion, Pleuritic Pain, Sputum, Wheezing


Cardiovascular: negative: chest pain, palpitations, orthopnea, paroxysmal 

nocturnal dyspnea, edema, light headedness, other


Gastrointestinal: negative: Nausea, Vomiting, Abdominal Pain, Diarrhea, 

Constipation, Melena, Hematochezia, Other


Genitourinary: negative: Dysuria, Frequency, Incontinence, Hematuria, Retention

, Other


Musculoskeletal: negative: Neck Pain, Shoulder Pain, Arm Pain, Back Pain, Hand 

Pain, Leg Pain, Foot Pain, Other


Skin: negative: Rash, Lesions, Sameer, Bruising, Other


Neurological: negative: Weakness, Numbness, Incoordination, Change in Speech, 

Confusion, Seizures, Other





- Medications/Allergies


Allergies/Adverse Reactions: 


 Allergies











Allergy/AdvReac Type Severity Reaction Status Date / Time


 


codeine Allergy   Verified 09/30/18 22:48


 


morphine Allergy   Verified 09/30/18 22:48


 


Penicillins Allergy   Verified 09/30/18 22:48











Medications: 


 Current Medications





Acetaminophen (Tylenol)  650 mg PO Q4H PRN


   PRN Reason: Headache/Fever or Pain


Divalproex Sodium (Depakote)  500 mg PO HS [facility]


   Last Admin: 10/08/18 21:41 Dose:  500 mg


Divalproex Sodium (Depakote)  250 mg PO DAILY [facility]


   Last Admin: 10/09/18 08:26 Dose:  250 mg


Famotidine (Pepcid)  20 mg PO DAILY [facility]


   Last Admin: 10/09/18 08:26 Dose:  20 mg


Guaifenesin/Dextromethorphan (Robitussin Dm)  15 ml PO Q4H PRN


   PRN Reason: Cough


   Last Admin: 10/03/18 21:23 Dose:  15 ml


Heparin Sodium (Porcine) (Heparin)  5,000 units SC BID [facility]


   Last Admin: 10/09/18 08:26 Dose:  5,000 units


Lithium Carbonate (Lithium Carbonate)  300 mg PO [facility]


   Last Admin: 10/09/18 08:26 Dose:  300 mg


Ondansetron HCl (Zofran)  4 mg IVP Q6H PRN


   PRN Reason: Nausea/Vomiting


Rifaximin (Xifaxan)  550 mg PO BID [facility]


   Last Admin: 10/09/18 08:26 Dose:  550 mg


Risperidone (Risperidone)  3 mg PO HS [facility]


   Last Admin: 10/08/18 21:41 Dose:  3 mg


Saccharomyces Boulardii (Florastor)  250 mg PO DAILY [facility]


Sertraline HCl (Zoloft)  50 mg PO DAILY [facility]


   Last Admin: 10/09/18 08:26 Dose:  50 mg


Vancomycin HCl (First Vancomycin)  125 mg PO QID [facility]


   Last Admin: 10/09/18 12:23 Dose:  125 mg

## 2018-10-10 LAB
A1AT PHENOTYP SERPL-IMP: (no result)
ALBUMIN SERPL BCG-MCNC: 3.1 G/DL (ref 3.5–5)
ANION GAP SERPL CALC-SCNC: 12 MMOL/L (ref 10–20)
BUN SERPL-MCNC: 18 MG/DL (ref 9.8–20.1)
BUN/CREAT SERPL: 25.35
CALCIUM SERPL-MCNC: 10 MG/DL (ref 7.8–10.44)
CHLORIDE SERPL-SCNC: 104 MMOL/L (ref 98–107)
CO2 SERPL-SCNC: 28 MMOL/L (ref 22–29)
CREAT CL PREDICTED SERPL C-G-VRATE: 79 ML/MIN (ref 70–130)
GLUCOSE SERPL-MCNC: 87 MG/DL (ref 70–105)
POTASSIUM SERPL-SCNC: 4.7 MMOL/L (ref 3.5–5.1)
SODIUM SERPL-SCNC: 139 MMOL/L (ref 136–145)

## 2018-10-10 RX ADMIN — HEPARIN SODIUM SCH UNITS: 5000 INJECTION, SOLUTION INTRAVENOUS; SUBCUTANEOUS at 20:21

## 2018-10-10 RX ADMIN — HEPARIN SODIUM SCH UNITS: 5000 INJECTION, SOLUTION INTRAVENOUS; SUBCUTANEOUS at 08:47

## 2018-10-10 RX ADMIN — VANCOMYCIN HYDROCHLORIDE SCH MG: KIT at 20:20

## 2018-10-10 RX ADMIN — DIVALPROEX SODIUM SCH MG: 250 TABLET, DELAYED RELEASE ORAL at 08:47

## 2018-10-10 RX ADMIN — VANCOMYCIN HYDROCHLORIDE SCH MG: KIT at 08:48

## 2018-10-10 RX ADMIN — DIVALPROEX SODIUM SCH MG: 500 TABLET, DELAYED RELEASE ORAL at 20:20

## 2018-10-10 RX ADMIN — VANCOMYCIN HYDROCHLORIDE SCH MG: KIT at 17:06

## 2018-10-10 RX ADMIN — VANCOMYCIN HYDROCHLORIDE SCH MG: KIT at 13:22

## 2018-10-10 NOTE — PDOC.PN
- Subjective


Encounter Start Date: 10/10/18


Encounter Start Time: 15:41


Subjective: feels much better. 2-3 soft BM


-: no N/V





- Objective


Resuscitation Status: 


 











Resuscitation Status           FULL:Full Resuscitation














MAR Reviewed: Yes


Vital Signs & Weight: 


 Vital Signs (12 hours)











  Temp Pulse Resp BP Pulse Ox


 


 10/10/18 11:39  98.0 F  71  16  122/73  93 L


 


 10/10/18 08:00      92 L


 


 10/10/18 07:26  98.5 F  99  18  145/56 H  92 L


 


 10/10/18 04:29  97.9 F  90  16  136/74  92 L








 Weight











Admit Weight                   129 lb 4.8 oz


 


Weight                         131 lb 8 oz














I&O: 


 











 10/09/18 10/10/18 10/11/18





 06:59 06:59 06:59


 


Intake Total 3500 720 


 


Output Total 4150 2800 


 


Balance -650 -2080 











Result Diagrams: 


 10/01/18 03:55





 10/10/18 04:12





Phys Exam





- Physical Examination


Constitutional: NAD


HEENT: PERRLA, moist MMs, sclera anicteric, oral pharynx no lesions


Neck: no nodes, no JVD, supple, full ROM


Respiratory: no wheezing, no rales, no rhonchi, clear to auscultation bilateral


Cardiovascular: RRR, no significant murmur, no rub


Gastrointestinal: soft, non-tender, no distention, positive bowel sounds


Musculoskeletal: no edema, pulses present


Neurological: non-focal, normal sensation, moves all 4 limbs


Psychiatric: normal affect, A&O x 3


Skin: no rash





Dx/Plan


(1) Clostridium difficile colitis


Status: Acute   





(2) Bipolar disorder


Code(s): F31.9 - BIPOLAR DISORDER, UNSPECIFIED   Status: Chronic   


Qualifiers: 


   Active/Remission status: remission status unspecified   Qualified Code(s): 

F31.9 - Bipolar disorder, unspecified   





(3) Dyslipidemia


Code(s): E78.5 - HYPERLIPIDEMIA, UNSPECIFIED   Status: Chronic   





(4) Hypertension


Code(s): I10 - ESSENTIAL (PRIMARY) HYPERTENSION   Status: Chronic   


Qualifiers: 


   Hypertension type: essential hypertension 





(5) Tobacco abuse


Code(s): Z72.0 - TOBACCO USE   Status: Chronic   





(6) Substance abuse


Code(s): F19.10 - OTHER PSYCHOACTIVE SUBSTANCE ABUSE, UNCOMPLICATED   Status: 

Chronic   Comment: pcp and cocaine, but denies   





(7) Acute renal failure


Status: Resolved   


Qualifiers: 


   Acute renal failure type: unspecified   Qualified Code(s): N17.9 - Acute 

kidney failure, unspecified   





- Plan


DVT proph w/SCDs


[facility] today on Po vancomycin


-: clinically better and Hd stable.





* .








Review of Systems





- Review of Systems


Constitutional: weakness, malaise.  negative: fever, chills, sweats, other


Respiratory: negative: Cough, Dry, Shortness of Breath, Hemoptysis, SOB with 

Excertion, Pleuritic Pain, Sputum, Wheezing


Cardiovascular: negative: chest pain, palpitations, orthopnea, paroxysmal 

nocturnal dyspnea, edema, light headedness, other


Gastrointestinal: negative: Nausea, Vomiting, Abdominal Pain, Diarrhea, 

Constipation, Melena, Hematochezia, Other


Genitourinary: negative: Dysuria, Frequency, Incontinence, Hematuria, Retention

, Other


Musculoskeletal: negative: Neck Pain, Shoulder Pain, Arm Pain, Back Pain, Hand 

Pain, Leg Pain, Foot Pain, Other


Neurological: negative: Weakness, Numbness, Incoordination, Change in Speech, 

Confusion, Seizures, Other





- Medications/Allergies


Allergies/Adverse Reactions: 


 Allergies











Allergy/AdvReac Type Severity Reaction Status Date / Time


 


codeine Allergy   Verified 09/30/18 22:48


 


morphine Allergy   Verified 09/30/18 22:48


 


Penicillins Allergy   Verified 09/30/18 22:48











Medications: 


 Current Medications





Acetaminophen (Tylenol)  650 mg PO Q4H PRN


   PRN Reason: Headache/Fever or Pain


Divalproex Sodium (Depakote)  500 mg PO HS [facility]


   Last Admin: 10/09/18 20:56 Dose:  500 mg


Divalproex Sodium (Depakote)  250 mg PO DAILY [facility]


   Last Admin: 10/10/18 08:47 Dose:  250 mg


Famotidine (Pepcid)  20 mg PO DAILY [facility]


   Last Admin: 10/10/18 08:47 Dose:  20 mg


Guaifenesin/Dextromethorphan (Robitussin Dm)  15 ml PO Q4H PRN


   PRN Reason: Cough


   Last Admin: 10/03/18 21:23 Dose:  15 ml


Heparin Sodium (Porcine) (Heparin)  5,000 units SC BID [facility]


   Last Admin: 10/10/18 08:47 Dose:  5,000 units


Lithium Carbonate (Lithium Carbonate)  300 mg PO QA-[facility]


   Last Admin: 10/10/18 08:46 Dose:  300 mg


Ondansetron HCl (Zofran)  4 mg IVP Q6H PRN


   PRN Reason: Nausea/Vomiting


Rifaximin (Xifaxan)  550 mg PO BID [facility]


   Last Admin: 10/10/18 08:47 Dose:  550 mg


Risperidone (Risperidone)  3 mg PO HS [facility]


   Last Admin: 10/09/18 20:55 Dose:  3 mg


Saccharomyces Boulardii (Florastor)  250 mg PO DAILY [facility]


   Last Admin: 10/10/18 08:47 Dose:  250 mg


Sertraline HCl (Zoloft)  50 mg PO DAILY [facility]


   Last Admin: 10/10/18 08:47 Dose:  50 mg


Vancomycin HCl (First Vancomycin)  125 mg PO QID [facility]


   Last Admin: 10/10/18 13:22 Dose:  125 mg

## 2018-10-11 LAB
ALBUMIN SERPL BCG-MCNC: 3.3 G/DL (ref 3.5–5)
ANION GAP SERPL CALC-SCNC: 12 MMOL/L (ref 10–20)
BUN SERPL-MCNC: 21 MG/DL (ref 9.8–20.1)
BUN/CREAT SERPL: 29.17
CALCIUM SERPL-MCNC: 10.3 MG/DL (ref 7.8–10.44)
CHLORIDE SERPL-SCNC: 105 MMOL/L (ref 98–107)
CO2 SERPL-SCNC: 27 MMOL/L (ref 22–29)
CREAT CL PREDICTED SERPL C-G-VRATE: 78 ML/MIN (ref 70–130)
GLUCOSE SERPL-MCNC: 92 MG/DL (ref 70–105)
POTASSIUM SERPL-SCNC: 4.7 MMOL/L (ref 3.5–5.1)
SODIUM SERPL-SCNC: 139 MMOL/L (ref 136–145)

## 2018-10-11 RX ADMIN — HEPARIN SODIUM SCH UNITS: 5000 INJECTION, SOLUTION INTRAVENOUS; SUBCUTANEOUS at 20:17

## 2018-10-11 RX ADMIN — VANCOMYCIN HYDROCHLORIDE SCH MG: KIT at 10:03

## 2018-10-11 RX ADMIN — HEPARIN SODIUM SCH UNITS: 5000 INJECTION, SOLUTION INTRAVENOUS; SUBCUTANEOUS at 10:04

## 2018-10-11 RX ADMIN — VANCOMYCIN HYDROCHLORIDE SCH MG: KIT at 17:44

## 2018-10-11 RX ADMIN — DIVALPROEX SODIUM SCH MG: 250 TABLET, DELAYED RELEASE ORAL at 10:05

## 2018-10-11 RX ADMIN — DIVALPROEX SODIUM SCH MG: 500 TABLET, DELAYED RELEASE ORAL at 20:17

## 2018-10-11 RX ADMIN — VANCOMYCIN HYDROCHLORIDE SCH MG: KIT at 12:27

## 2018-10-11 NOTE — PDOC.PN
- Subjective


Encounter Start Date: 10/11/18


Encounter Start Time: 13:36


Subjective: feels OK. no abd pain.appetite good.no N/V


-: 3-4 soft BMs 





- Objective


Resuscitation Status: 


 











Resuscitation Status           FULL:Full Resuscitation














MAR Reviewed: Yes


Vital Signs & Weight: 


 Vital Signs (12 hours)











  Temp Pulse Resp BP Pulse Ox


 


 10/11/18 08:01  97.6 F  95  18  132/71  95


 


 10/11/18 08:00      95


 


 10/11/18 06:58  98.9 F  108 H  20  112/69  90 L








 Weight











Admit Weight                   129 lb 4.8 oz


 


Weight                         131 lb 8 oz














I&O: 


 











 10/10/18 10/11/18 10/12/18





 06:59 06:59 06:59


 


Intake Total 720 1470 240


 


Output Total 2800 3700 


 


Balance -2080 -2230 240











Result Diagrams: 


 10/01/18 03:55





 10/11/18 04:25


Additional Labs: 





Microbiology





10/01/18 00:45   Urine lemus catheter   Urine Culture - Final


                              NO GROWTH AT 36 HOURS


10/01/18 00:45   Stool - Liquid   Stool Culture - Final


10/01/18 00:45   Stool - Liquid   Escherichia coli 0157 Culture - Final


10/01/18 00:45   Stool - Liquid   Campylobacter Antigen Assay - Final


10/01/18 00:45   Stool - Liquid   Shiga Toxin Test - Final


10/01/18 00:45   Stool   C. difficile GDH Antigen & Toxins - Final


10/01/18 00:45   Stool   Clostridium difficile Toxin A&B PCR - Final


09/30/18 20:16   Venous blood - Right Arm   Blood Culture - Final


                              NO GROWTH IN 5 DAYS


09/30/18 18:16   Venous blood - Left Hand   Blood Culture - Final


                              NO GROWTH IN 5 DAYS











Phys Exam





- Physical Examination


Constitutional: NAD


HEENT: PERRLA, moist MMs, sclera anicteric, oral pharynx no lesions


Neck: no nodes, no JVD, supple, full ROM


Respiratory: no wheezing, no rales, no rhonchi, clear to auscultation bilateral


Cardiovascular: RRR, no significant murmur, no rub


Gastrointestinal: soft, non-tender, no distention, positive bowel sounds


Musculoskeletal: no edema, pulses present


Neurological: non-focal, normal sensation, moves all 4 limbs


Psychiatric: normal affect, A&O x 3


Skin: no rash





Dx/Plan


(1) Clostridium difficile colitis


Status: Acute   





(2) Bipolar disorder


Code(s): F31.9 - BIPOLAR DISORDER, UNSPECIFIED   Status: Chronic   


Qualifiers: 


   Active/Remission status: remission status unspecified   Qualified Code(s): 

F31.9 - Bipolar disorder, unspecified   





(3) Dyslipidemia


Code(s): E78.5 - HYPERLIPIDEMIA, UNSPECIFIED   Status: Chronic   





(4) Hypertension


Code(s): I10 - ESSENTIAL (PRIMARY) HYPERTENSION   Status: Chronic   


Qualifiers: 


   Hypertension type: essential hypertension 





(5) Tobacco abuse


Code(s): Z72.0 - TOBACCO USE   Status: Chronic   





(6) Substance abuse


Code(s): F19.10 - OTHER PSYCHOACTIVE SUBSTANCE ABUSE, UNCOMPLICATED   Status: 

Chronic   Comment: pcp and cocaine, but denies   





(7) Acute renal failure


Status: Resolved   


Qualifiers: 


   Acute renal failure type: unspecified   Qualified Code(s): N17.9 - Acute 

kidney failure, unspecified   





- Plan


DC to rehab when accpeted.awaitinf Pass[facility] form .


-: avoidable day #1


-: cont rifaximin ,vanco PO.florastor


-: hemodynamically stable.supportive care


-: DC lemus prior to DC





* .








Review of Systems





- Review of Systems


Constitutional: weakness, malaise.  negative: fever, chills, sweats, other


Respiratory: negative: Cough, Dry, Shortness of Breath, Hemoptysis, SOB with 

Excertion, Pleuritic Pain, Sputum, Wheezing


Cardiovascular: negative: chest pain, palpitations, orthopnea, paroxysmal 

nocturnal dyspnea, edema, light headedness, other


Gastrointestinal: negative: Nausea, Vomiting, Abdominal Pain, Diarrhea, 

Constipation, Melena, Hematochezia, Other


Genitourinary: negative: Dysuria, Frequency, Incontinence, Hematuria, Retention

, Other


Musculoskeletal: negative: Neck Pain, Shoulder Pain, Arm Pain, Back Pain, Hand 

Pain, Leg Pain, Foot Pain, Other


Neurological: negative: Weakness, Numbness, Incoordination, Change in Speech, 

Confusion, Seizures, Other





- Medications/Allergies


Allergies/Adverse Reactions: 


 Allergies











Allergy/AdvReac Type Severity Reaction Status Date / Time


 


codeine Allergy   Verified 09/30/18 22:48


 


morphine Allergy   Verified 09/30/18 22:48


 


Penicillins Allergy   Verified 09/30/18 22:48











Medications: 


 Current Medications





Acetaminophen (Tylenol)  650 mg PO Q4H PRN


   PRN Reason: Headache/Fever or Pain


Divalproex Sodium (Depakote)  500 mg PO HS [facility]


   Last Admin: 10/10/18 20:20 Dose:  500 mg


Divalproex Sodium (Depakote)  250 mg PO DAILY [facility]


   Last Admin: 10/11/18 10:05 Dose:  250 mg


Famotidine (Pepcid)  20 mg PO DAILY [facility]


   Last Admin: 10/11/18 10:04 Dose:  20 mg


Guaifenesin/Dextromethorphan (Robitussin Dm)  15 ml PO Q4H PRN


   PRN Reason: Cough


   Last Admin: 10/03/18 21:23 Dose:  15 ml


Heparin Sodium (Porcine) (Heparin)  5,000 units SC BID [facility]


   Last Admin: 10/11/18 10:04 Dose:  5,000 units


Lithium Carbonate (Lithium Carbonate)  300 mg PO QAM-[facility]


   Last Admin: 10/11/18 10:04 Dose:  300 mg


Ondansetron HCl (Zofran)  4 mg IVP Q6H PRN


   PRN Reason: Nausea/Vomiting


Rifaximin (Xifaxan)  550 mg PO BID [facility]


   Last Admin: 10/11/18 10:04 Dose:  550 mg


Risperidone (Risperidone)  3 mg PO HS [facility]


   Last Admin: 10/10/18 20:20 Dose:  3 mg


Saccharomyces Boulardii (Florastor)  250 mg PO DAILY [facility]


   Last Admin: 10/11/18 10:04 Dose:  250 mg


Sertraline HCl (Zoloft)  50 mg PO DAILY [facility]


   Last Admin: 10/11/18 10:04 Dose:  50 mg


Vancomycin HCl (First Vancomycin)  125 mg PO QID [facility]


   Last Admin: 10/11/18 12:27 Dose:  125 mg

## 2018-10-12 LAB
ALBUMIN SERPL BCG-MCNC: 3.5 G/DL (ref 3.5–5)
ANION GAP SERPL CALC-SCNC: 16 MMOL/L (ref 10–20)
BUN SERPL-MCNC: 18 MG/DL (ref 9.8–20.1)
BUN/CREAT SERPL: 23.68
CALCIUM SERPL-MCNC: 10.5 MG/DL (ref 7.8–10.44)
CHLORIDE SERPL-SCNC: 107 MMOL/L (ref 98–107)
CO2 SERPL-SCNC: 21 MMOL/L (ref 22–29)
CREAT CL PREDICTED SERPL C-G-VRATE: 74 ML/MIN (ref 70–130)
GLUCOSE SERPL-MCNC: 72 MG/DL (ref 70–105)
POTASSIUM SERPL-SCNC: 4.9 MMOL/L (ref 3.5–5.1)
SODIUM SERPL-SCNC: 139 MMOL/L (ref 136–145)

## 2018-10-12 RX ADMIN — DIVALPROEX SODIUM SCH MG: 500 TABLET, DELAYED RELEASE ORAL at 22:12

## 2018-10-12 RX ADMIN — DIVALPROEX SODIUM SCH MG: 250 TABLET, DELAYED RELEASE ORAL at 08:21

## 2018-10-12 RX ADMIN — HEPARIN SODIUM SCH UNITS: 5000 INJECTION, SOLUTION INTRAVENOUS; SUBCUTANEOUS at 22:13

## 2018-10-12 RX ADMIN — HEPARIN SODIUM SCH UNITS: 5000 INJECTION, SOLUTION INTRAVENOUS; SUBCUTANEOUS at 08:21

## 2018-10-12 NOTE — PDOC.PN
- Subjective


Encounter Start Date: 10/12/18


Encounter Start Time: 15:12


Subjective: feels better.still having 4-5 loose stools


-: no abd pain/N/V





- Objective


Resuscitation Status: 


 











Resuscitation Status           FULL:Full Resuscitation














MAR Reviewed: Yes


Vital Signs & Weight: 


 Vital Signs (12 hours)











  Temp Pulse Resp BP Pulse Ox


 


 10/12/18 12:12  97.8 F  79  18  96/63  92 L


 


 10/12/18 08:00  98.5 F  108 H  20  101/67  91 L








 Weight











Admit Weight                   129 lb 4.8 oz


 


Weight                         131 lb 8 oz














I&O: 


 











 10/11/18 10/12/18 10/13/18





 06:59 06:59 06:59


 


Intake Total 1470 1520 


 


Output Total 3700 1975 


 


Balance -2230 -455 











Result Diagrams: 


 10/01/18 03:55





 10/12/18 03:30


Additional Labs: 





Microbiology





10/01/18 00:45   Urine lemus catheter   Urine Culture - Final


                              NO GROWTH AT 36 HOURS


10/01/18 00:45   Stool - Liquid   Stool Culture - Final


10/01/18 00:45   Stool - Liquid   Escherichia coli 0157 Culture - Final


10/01/18 00:45   Stool - Liquid   Campylobacter Antigen Assay - Final


10/01/18 00:45   Stool - Liquid   Shiga Toxin Test - Final


10/01/18 00:45   Stool   C. difficile GDH Antigen & Toxins - Final


10/01/18 00:45   Stool   Clostridium difficile Toxin A&B PCR - Final


09/30/18 20:16   Venous blood - Right Arm   Blood Culture - Final


                              NO GROWTH IN 5 DAYS


09/30/18 18:16   Venous blood - Left Hand   Blood Culture - Final


                              NO GROWTH IN 5 DAYS











Phys Exam





- Physical Examination


Constitutional: NAD


HEENT: PERRLA, moist MMs, sclera anicteric, oral pharynx no lesions


Neck: no nodes, no JVD, supple, full ROM


Respiratory: no wheezing, no rales, no rhonchi, clear to auscultation bilateral


Cardiovascular: RRR, no significant murmur, no rub


Gastrointestinal: soft, non-tender, no distention, positive bowel sounds


Musculoskeletal: no edema, pulses present


Neurological: non-focal, normal sensation, moves all 4 limbs


Psychiatric: normal affect, A&O x 3


Skin: no rash





Dx/Plan


(1) Clostridium difficile colitis


Status: Acute   Comment: on PO vanco   





(2) Bipolar disorder


Code(s): F31.9 - BIPOLAR DISORDER, UNSPECIFIED   Status: Chronic   


Qualifiers: 


   Active/Remission status: remission status unspecified   Qualified Code(s): 

F31.9 - Bipolar disorder, unspecified   





(3) Dyslipidemia


Code(s): E78.5 - HYPERLIPIDEMIA, UNSPECIFIED   Status: Chronic   





(4) Hypertension


Code(s): I10 - ESSENTIAL (PRIMARY) HYPERTENSION   Status: Chronic   


Qualifiers: 


   Hypertension type: essential hypertension 





(5) Tobacco abuse


Code(s): Z72.0 - TOBACCO USE   Status: Chronic   





(6) Substance abuse


Code(s): F19.10 - OTHER PSYCHOACTIVE SUBSTANCE ABUSE, UNCOMPLICATED   Status: 

Chronic   Comment: pcp and cocaine, but denies   





(7) Acute renal failure


Status: Resolved   


Qualifiers: 


   Acute renal failure type: unspecified   Qualified Code(s): N17.9 - Acute 

kidney failure, unspecified   





- Plan


continue antibiotics, out of bed/ambulate, DVT proph w/SCDs


Cont Rifaximin and PO vancomycin


-: still very weak and not able to take care of herself


-: High risk of fall,decompensation & re admission if sent home


-: needs placement. message left w [employer] w the insurance company


-: active APS case ongoing.will follow





* .








Review of Systems





- Review of Systems


Constitutional: negative: fever, chills, sweats, weakness, malaise, other


ENT: negative: Ear Pain, Ear Discharge, Nose Pain, Nose Discharge, Nose 

Congestion, Mouth Pain, Mouth Swelling, Throat Pain, Throat Swelling, Other


Respiratory: negative: Cough, Dry, Shortness of Breath, Hemoptysis, SOB with 

Excertion, Pleuritic Pain, Sputum, Wheezing


Cardiovascular: negative: chest pain, palpitations, orthopnea, paroxysmal 

nocturnal dyspnea, edema, light headedness, other


Gastrointestinal: Diarrhea.  negative: Nausea, Vomiting, Abdominal Pain, 

Constipation, Melena, Hematochezia, Other


Genitourinary: negative: Dysuria, Frequency, Incontinence, Hematuria, Retention

, Other


Musculoskeletal: negative: Neck Pain, Shoulder Pain, Arm Pain, Back Pain, Hand 

Pain, Leg Pain, Foot Pain, Other


Skin: negative: Rash, Lesions, Sameer, Bruising, Other


Neurological: negative: Weakness, Numbness, Incoordination, Change in Speech, 

Confusion, Seizures, Other





- Medications/Allergies


Allergies/Adverse Reactions: 


 Allergies











Allergy/AdvReac Type Severity Reaction Status Date / Time


 


codeine Allergy   Verified 09/30/18 22:48


 


morphine Allergy   Verified 09/30/18 22:48


 


Penicillins Allergy   Verified 09/30/18 22:48











Medications: 


 Current Medications





Acetaminophen (Tylenol)  650 mg PO Q4H PRN


   PRN Reason: Headache/Fever or Pain


Divalproex Sodium (Depakote)  500 mg PO HS [facility]


   Last Admin: 10/11/18 20:17 Dose:  500 mg


Divalproex Sodium (Depakote)  250 mg PO DAILY [facility]


   Last Admin: 10/12/18 08:21 Dose:  250 mg


Famotidine (Pepcid)  20 mg PO DAILY [facility]


   Last Admin: 10/12/18 08:21 Dose:  20 mg


Guaifenesin/Dextromethorphan (Robitussin Dm)  15 ml PO Q4H PRN


   PRN Reason: Cough


   Last Admin: 10/03/18 21:23 Dose:  15 ml


Heparin Sodium (Porcine) (Heparin)  5,000 units SC BID [facility]


   Last Admin: 10/12/18 08:21 Dose:  5,000 units


Lithium Carbonate (Lithium Carbonate)  300 mg PO QA-[facility]


   Last Admin: 10/12/18 13:25 Dose:  300 mg


Ondansetron HCl (Zofran)  4 mg IVP Q6H PRN


   PRN Reason: Nausea/Vomiting


Rifaximin (Xifaxan)  550 mg PO BID [facility]


   Last Admin: 10/12/18 08:21 Dose:  550 mg


Risperidone (Risperidone)  3 mg PO HS [facility]


   Last Admin: 10/11/18 20:17 Dose:  3 mg


Saccharomyces Boulardii (Florastor)  250 mg PO DAILY [facility]


   Last Admin: 10/12/18 08:21 Dose:  250 mg


Sertraline HCl (Zoloft)  50 mg PO DAILY [facility]


   Last Admin: 10/12/18 08:21 Dose:  50 mg

## 2018-10-12 NOTE — PDOC.PN
- Subjective


Encounter Start Date: 10/12/18


Encounter Start Time: 13:11





- Objective


Resuscitation Status: 


 











Resuscitation Status           FULL:Full Resuscitation














Vital Signs & Weight: 


 Vital Signs (12 hours)











  Temp Pulse Resp BP Pulse Ox


 


 10/12/18 12:12  97.8 F  79  18  96/63  92 L


 


 10/12/18 08:00  98.5 F  108 H  20  101/67  91 L








 Weight











Admit Weight                   129 lb 4.8 oz


 


Weight                         131 lb 8 oz














I&O: 


 











 10/11/18 10/12/18 10/13/18





 06:59 06:59 06:59


 


Intake Total 1470 1520 


 


Output Total 3700 1975 


 


Balance -9880 455 











Result Diagrams: 


 10/01/18 03:55





 10/12/18 03:30





Dx/Plan


(1) Clostridium difficile colitis


Status: Acute   





(2) Bipolar disorder


Code(s): F31.9 - BIPOLAR DISORDER, UNSPECIFIED   Status: Chronic   


Qualifiers: 


   Active/Remission status: remission status unspecified   Qualified Code(s): 

F31.9 - Bipolar disorder, unspecified   





(3) Dyslipidemia


Code(s): E78.5 - HYPERLIPIDEMIA, UNSPECIFIED   Status: Chronic   





(4) Hypertension


Code(s): I10 - ESSENTIAL (PRIMARY) HYPERTENSION   Status: Chronic   


Qualifiers: 


   Hypertension type: essential hypertension 





(5) Tobacco abuse


Code(s): Z72.0 - TOBACCO USE   Status: Chronic   





(6) Substance abuse


Code(s): F19.10 - OTHER PSYCHOACTIVE SUBSTANCE ABUSE, UNCOMPLICATED   Status: 

Chronic   Comment: pcp and cocaine, but denies   





(7) Acute renal failure


Status: Resolved   


Qualifiers: 


   Acute renal failure type: unspecified   Qualified Code(s): N17.9 - Acute 

kidney failure, unspecified   





- Plan





* .








Review of Systems





- Review of Systems


Constitutional: weakness, malaise.  negative: fever, chills, sweats, other


ENT: negative: Ear Pain, Ear Discharge, Nose Pain, Nose Discharge, Nose 

Congestion, Mouth Pain, Mouth Swelling, Throat Pain, Throat Swelling, Other


Respiratory: negative: Cough, Dry, Shortness of Breath, Hemoptysis, SOB with 

Excertion, Pleuritic Pain, Sputum, Wheezing


Cardiovascular: negative: chest pain, palpitations, orthopnea, paroxysmal 

nocturnal dyspnea, edema, light headedness, other


Gastrointestinal: Diarrhea.  negative: Nausea, Vomiting, Abdominal Pain, 

Constipation, Melena, Hematochezia, Other


Genitourinary: negative: Dysuria, Frequency, Incontinence, Hematuria, Retention

, Other


Musculoskeletal: negative: Neck Pain, Shoulder Pain, Arm Pain, Back Pain, Hand 

Pain, Leg Pain, Foot Pain, Other


Neurological: negative: Weakness, Numbness, Incoordination, Change in Speech, 

Confusion, Seizures, Other





- Medications/Allergies


Allergies/Adverse Reactions: 


 Allergies











Allergy/AdvReac Type Severity Reaction Status Date / Time


 


codeine Allergy   Verified 09/30/18 22:48


 


morphine Allergy   Verified 09/30/18 22:48


 


Penicillins Allergy   Verified 09/30/18 22:48











Medications: 


 Current Medications





Acetaminophen (Tylenol)  650 mg PO Q4H PRN


   PRN Reason: Headache/Fever or Pain


Divalproex Sodium (Depakote)  500 mg PO HS [facility]


   Last Admin: 10/11/18 20:17 Dose:  500 mg


Divalproex Sodium (Depakote)  250 mg PO DAILY [facility]


   Last Admin: 10/12/18 08:21 Dose:  250 mg


Famotidine (Pepcid)  20 mg PO DAILY [facility]


   Last Admin: 10/12/18 08:21 Dose:  20 mg


Guaifenesin/Dextromethorphan (Robitussin Dm)  15 ml PO Q4H PRN


   PRN Reason: Cough


   Last Admin: 10/03/18 21:23 Dose:  15 ml


Heparin Sodium (Porcine) (Heparin)  5,000 units SC BID [facility]


   Last Admin: 10/12/18 08:21 Dose:  5,000 units


Lithium Carbonate (Lithium Carbonate)  300 mg PO QA-[facility]


   Last Admin: 10/11/18 10:04 Dose:  300 mg


Ondansetron HCl (Zofran)  4 mg IVP Q6H PRN


   PRN Reason: Nausea/Vomiting


Rifaximin (Xifaxan)  550 mg PO BID [facility]


   Last Admin: 10/12/18 08:21 Dose:  550 mg


Risperidone (Risperidone)  3 mg PO HS [facility]


   Last Admin: 10/11/18 20:17 Dose:  3 mg


Saccharomyces Boulardii (Florastor)  250 mg PO DAILY [facility]


   Last Admin: 10/12/18 08:21 Dose:  250 mg


Sertraline HCl (Zoloft)  50 mg PO DAILY [facility]


   Last Admin: 10/12/18 08:21 Dose:  50 mg

## 2018-10-13 LAB
ALBUMIN SERPL BCG-MCNC: 3.4 G/DL (ref 3.5–5)
ANION GAP SERPL CALC-SCNC: 13 MMOL/L (ref 10–20)
BUN SERPL-MCNC: 18 MG/DL (ref 9.8–20.1)
BUN/CREAT SERPL: 25
CALCIUM SERPL-MCNC: 10.1 MG/DL (ref 7.8–10.44)
CHLORIDE SERPL-SCNC: 107 MMOL/L (ref 98–107)
CO2 SERPL-SCNC: 22 MMOL/L (ref 22–29)
CREAT CL PREDICTED SERPL C-G-VRATE: 78 ML/MIN (ref 70–130)
GLUCOSE SERPL-MCNC: 96 MG/DL (ref 70–105)
POTASSIUM SERPL-SCNC: 4.2 MMOL/L (ref 3.5–5.1)
SODIUM SERPL-SCNC: 138 MMOL/L (ref 136–145)

## 2018-10-13 RX ADMIN — HEPARIN SODIUM SCH UNITS: 5000 INJECTION, SOLUTION INTRAVENOUS; SUBCUTANEOUS at 07:58

## 2018-10-13 RX ADMIN — DIVALPROEX SODIUM SCH MG: 250 TABLET, DELAYED RELEASE ORAL at 07:52

## 2018-10-13 RX ADMIN — HEPARIN SODIUM SCH UNITS: 5000 INJECTION, SOLUTION INTRAVENOUS; SUBCUTANEOUS at 19:56

## 2018-10-13 RX ADMIN — DIVALPROEX SODIUM SCH MG: 500 TABLET, DELAYED RELEASE ORAL at 19:55

## 2018-10-13 NOTE — PDOC.PN
- Subjective


Encounter Start Date: 10/13/18


Encounter Start Time: 14:50


Subjective: pt still having 3-4 loos estools daily w/o pain/n/v


-: no fever/chills





- Objective


Resuscitation Status: 


 











Resuscitation Status           FULL:Full Resuscitation














MAR Reviewed: Yes


Vital Signs & Weight: 


 Vital Signs (12 hours)











  Temp Pulse Resp BP Pulse Ox


 


 10/13/18 08:00  97.8 F  90  18  115/66  94 L








 Weight











Admit Weight                   129 lb 4.8 oz


 


Weight                         131 lb 8 oz














I&O: 


 











 10/12/18 10/13/18 10/14/18





 06:59 06:59 06:59


 


Intake Total 1520 1750 


 


Output Total 1975 2350 


 


Balance -455 -600 











Result Diagrams: 


 10/01/18 03:55





 10/13/18 04:35





Phys Exam





- Physical Examination


Constitutional: NAD


HEENT: PERRLA, moist MMs, sclera anicteric, oral pharynx no lesions


Neck: no nodes, no JVD, supple, full ROM


Respiratory: no wheezing, no rales, no rhonchi, clear to auscultation bilateral


Cardiovascular: RRR, no significant murmur, no rub


Gastrointestinal: soft, non-tender, no distention, positive bowel sounds


Musculoskeletal: no edema, pulses present


Neurological: non-focal, normal sensation, moves all 4 limbs


Psychiatric: normal affect, A&O x 3


Skin: no rash





Dx/Plan


(1) Clostridium difficile colitis


Status: Acute   Comment: on PO vanco and Xifaxan   





(2) Bipolar disorder


Code(s): F31.9 - BIPOLAR DISORDER, UNSPECIFIED   Status: Chronic   


Qualifiers: 


   Active/Remission status: remission status unspecified   Qualified Code(s): 

F31.9 - Bipolar disorder, unspecified   





(3) Dyslipidemia


Code(s): E78.5 - HYPERLIPIDEMIA, UNSPECIFIED   Status: Chronic   





(4) Hypertension


Code(s): I10 - ESSENTIAL (PRIMARY) HYPERTENSION   Status: Chronic   


Qualifiers: 


   Hypertension type: essential hypertension 





(5) Tobacco abuse


Code(s): Z72.0 - TOBACCO USE   Status: Chronic   





(6) Substance abuse


Code(s): F19.10 - OTHER PSYCHOACTIVE SUBSTANCE ABUSE, UNCOMPLICATED   Status: 

Chronic   Comment: pcp and cocaine, but denies   





(7) Acute renal failure


Status: Resolved   


Qualifiers: 


   Acute renal failure type: unspecified   Qualified Code(s): N17.9 - Acute 

kidney failure, unspecified   





- Plan


continue antibiotics, out of bed/ambulate, DVT proph w/SCDs


Called Insurance twice yesterdayabout denial to SNIF but no luck


-: pt will be Dced to home w HH.discussed w her


-: encouraged to get care provider for home


-: Can Dc when HH arranged.hemodynamically stable





* .








Review of Systems





- Review of Systems


Constitutional: negative: fever, chills, sweats, weakness, malaise, other


ENT: negative: Ear Pain, Ear Discharge, Nose Pain, Nose Discharge, Nose 

Congestion, Mouth Pain, Mouth Swelling, Throat Pain, Throat Swelling, Other


Respiratory: negative: Cough, Dry, Shortness of Breath, Hemoptysis, SOB with 

Excertion, Pleuritic Pain, Sputum, Wheezing


Cardiovascular: negative: chest pain, palpitations, orthopnea, paroxysmal 

nocturnal dyspnea, edema, light headedness, other


Gastrointestinal: negative: Nausea, Vomiting, Abdominal Pain, Diarrhea, 

Constipation, Melena, Hematochezia, Other


Genitourinary: negative: Dysuria, Frequency, Incontinence, Hematuria, Retention

, Other


Musculoskeletal: negative: Neck Pain, Shoulder Pain, Arm Pain, Back Pain, Hand 

Pain, Leg Pain, Foot Pain, Other


Neurological: negative: Weakness, Numbness, Incoordination, Change in Speech, 

Confusion, Seizures, Other





- Medications/Allergies


Allergies/Adverse Reactions: 


 Allergies











Allergy/AdvReac Type Severity Reaction Status Date / Time


 


codeine Allergy   Verified 09/30/18 22:48


 


morphine Allergy   Verified 09/30/18 22:48


 


Penicillins Allergy   Verified 09/30/18 22:48











Medications: 


 Current Medications





Acetaminophen (Tylenol)  650 mg PO Q4H PRN


   PRN Reason: Headache/Fever or Pain


Divalproex Sodium (Depakote)  500 mg PO HS [facility]


   Last Admin: 10/12/18 22:12 Dose:  500 mg


Divalproex Sodium (Depakote)  250 mg PO DAILY [facility]


   Last Admin: 10/13/18 07:52 Dose:  250 mg


Famotidine (Pepcid)  20 mg PO DAILY [facility]


   Last Admin: 10/13/18 07:52 Dose:  20 mg


Guaifenesin/Dextromethorphan (Robitussin Dm)  15 ml PO Q4H PRN


   PRN Reason: Cough


   Last Admin: 10/03/18 21:23 Dose:  15 ml


Heparin Sodium (Porcine) (Heparin)  5,000 units SC BID [facility]


   Last Admin: 10/13/18 07:58 Dose:  5,000 units


Lithium Carbonate (Lithium Carbonate)  300 mg PO QAM-WM [facility]


   Last Admin: 10/13/18 07:58 Dose:  300 mg


Ondansetron HCl (Zofran)  4 mg IVP Q6H PRN


   PRN Reason: Nausea/Vomiting


Rifaximin (Xifaxan)  550 mg PO BID [facility]


   Last Admin: 10/13/18 07:52 Dose:  550 mg


Risperidone (Risperidone)  3 mg PO HS [facility]


   Last Admin: 10/13/18 00:42 Dose:  3 mg


Saccharomyces Boulardii (Florastor)  250 mg PO DAILY [facility]


   Last Admin: 10/13/18 07:51 Dose:  250 mg


Sertraline HCl (Zoloft)  50 mg PO DAILY [facility]


   Last Admin: 10/13/18 07:52 Dose:  50 mg

## 2018-10-14 LAB
ALBUMIN SERPL BCG-MCNC: 3.4 G/DL (ref 3.5–5)
ANION GAP SERPL CALC-SCNC: 11 MMOL/L (ref 10–20)
BUN SERPL-MCNC: 16 MG/DL (ref 9.8–20.1)
BUN/CREAT SERPL: 21.62
CALCIUM SERPL-MCNC: 9.8 MG/DL (ref 7.8–10.44)
CHLORIDE SERPL-SCNC: 107 MMOL/L (ref 98–107)
CO2 SERPL-SCNC: 25 MMOL/L (ref 22–29)
CREAT CL PREDICTED SERPL C-G-VRATE: 76 ML/MIN (ref 70–130)
GLUCOSE SERPL-MCNC: 98 MG/DL (ref 70–105)
POTASSIUM SERPL-SCNC: 4 MMOL/L (ref 3.5–5.1)
SODIUM SERPL-SCNC: 139 MMOL/L (ref 136–145)

## 2018-10-14 RX ADMIN — DIVALPROEX SODIUM SCH MG: 500 TABLET, DELAYED RELEASE ORAL at 22:41

## 2018-10-14 RX ADMIN — VANCOMYCIN HYDROCHLORIDE SCH MG: KIT at 16:20

## 2018-10-14 RX ADMIN — HEPARIN SODIUM SCH UNITS: 5000 INJECTION, SOLUTION INTRAVENOUS; SUBCUTANEOUS at 22:42

## 2018-10-14 RX ADMIN — DIVALPROEX SODIUM SCH MG: 250 TABLET, DELAYED RELEASE ORAL at 07:59

## 2018-10-14 RX ADMIN — VANCOMYCIN HYDROCHLORIDE SCH MG: KIT at 22:49

## 2018-10-14 RX ADMIN — HEPARIN SODIUM SCH UNITS: 5000 INJECTION, SOLUTION INTRAVENOUS; SUBCUTANEOUS at 08:00

## 2018-10-14 NOTE — PDOC.PN
- Subjective


Encounter Start Date: 10/14/18


Encounter Start Time: 11:14


Subjective: no new complaints


-: feels well





- Objective


Resuscitation Status: 


 











Resuscitation Status           FULL:Full Resuscitation














MAR Reviewed: Yes


Vital Signs & Weight: 


 Vital Signs (12 hours)











  Pulse Ox


 


 10/14/18 08:00  93 L








 Weight











Admit Weight                   129 lb 4.8 oz


 


Weight                         131 lb 8 oz














I&O: 


 











 10/13/18 10/14/18 10/15/18





 06:59 06:59 06:59


 


Intake Total 1750 2960 


 


Output Total 2350 500 


 


Balance -600 2460 











Result Diagrams: 


 10/01/18 03:55





 10/14/18 03:37





Phys Exam





- Physical Examination


Constitutional: NAD


HEENT: PERRLA, moist MMs, sclera anicteric, oral pharynx no lesions


Neck: no nodes, no JVD, supple, full ROM


Respiratory: no wheezing, no rales, no rhonchi, clear to auscultation bilateral


Cardiovascular: RRR, no significant murmur


Gastrointestinal: soft, non-tender, no distention, positive bowel sounds


Musculoskeletal: no edema, pulses present


Neurological: non-focal, normal sensation, moves all 4 limbs


Psychiatric: normal affect, A&O x 3





Dx/Plan


(1) Clostridium difficile colitis


Status: Acute   Comment: on PO vanco and Xifaxan   





(2) Bipolar disorder


Code(s): F31.9 - BIPOLAR DISORDER, UNSPECIFIED   Status: Chronic   


Qualifiers: 


   Active/Remission status: remission status unspecified   Qualified Code(s): 

F31.9 - Bipolar disorder, unspecified   





(3) Dyslipidemia


Code(s): E78.5 - HYPERLIPIDEMIA, UNSPECIFIED   Status: Chronic   





(4) Hypertension


Code(s): I10 - ESSENTIAL (PRIMARY) HYPERTENSION   Status: Chronic   


Qualifiers: 


   Hypertension type: essential hypertension 





(5) Tobacco abuse


Code(s): Z72.0 - TOBACCO USE   Status: Chronic   





(6) Substance abuse


Code(s): F19.10 - OTHER PSYCHOACTIVE SUBSTANCE ABUSE, UNCOMPLICATED   Status: 

Chronic   Comment: pcp and cocaine, but denies   





(7) Acute renal failure


Status: Resolved   


Qualifiers: 


   Acute renal failure type: unspecified   Qualified Code(s): N17.9 - Acute 

kidney failure, unspecified   





- Plan


out of bed/ambulate, DVT proph w/SCDs


prescriptions sent for family to 


-: Pt will be Dced once CM makes sure family will get py's her meds


-: denies for rehab as walking independently.


-: Info w/b provided for Care provider Services


-: Hd stable





* .








Review of Systems





- Review of Systems


Constitutional: weakness.  negative: fever, chills, sweats, malaise, other


ENT: negative: Ear Pain, Ear Discharge, Nose Pain, Nose Discharge, Nose 

Congestion, Mouth Pain, Mouth Swelling, Throat Pain, Throat Swelling, Other


Respiratory: negative: Cough, Dry, Shortness of Breath, Hemoptysis, SOB with 

Excertion, Pleuritic Pain, Sputum, Wheezing


Cardiovascular: negative: chest pain, palpitations, orthopnea, paroxysmal 

nocturnal dyspnea, edema, light headedness, other


Gastrointestinal: negative: Nausea, Vomiting, Abdominal Pain, Diarrhea, 

Constipation, Melena, Hematochezia, Other


Genitourinary: negative: Dysuria, Frequency, Incontinence, Hematuria, Retention

, Other


Musculoskeletal: negative: Neck Pain, Shoulder Pain, Arm Pain, Back Pain, Hand 

Pain, Leg Pain, Foot Pain, Other


Neurological: negative: Weakness, Numbness, Incoordination, Change in Speech, 

Confusion, Seizures, Other





- Medications/Allergies


Allergies/Adverse Reactions: 


 Allergies











Allergy/AdvReac Type Severity Reaction Status Date / Time


 


codeine Allergy   Verified 09/30/18 22:48


 


morphine Allergy   Verified 09/30/18 22:48


 


Penicillins Allergy   Verified 09/30/18 22:48











Medications: 


 Current Medications





Acetaminophen (Tylenol)  650 mg PO Q4H PRN


   PRN Reason: Headache/Fever or Pain


Divalproex Sodium (Depakote)  500 mg PO HS [facility]


   Last Admin: 10/13/18 19:55 Dose:  500 mg


Divalproex Sodium (Depakote)  250 mg PO DAILY [facility]


   Last Admin: 10/14/18 07:59 Dose:  250 mg


Famotidine (Pepcid)  20 mg PO DAILY [facility]


   Last Admin: 10/14/18 07:59 Dose:  20 mg


Guaifenesin/Dextromethorphan (Robitussin Dm)  15 ml PO Q4H PRN


   PRN Reason: Cough


   Last Admin: 10/03/18 21:23 Dose:  15 ml


Heparin Sodium (Porcine) (Heparin)  5,000 units SC BID [facility]


   Last Admin: 10/14/18 08:00 Dose:  5,000 units


Lithium Carbonate (Lithium Carbonate)  300 mg PO QAM-WM [facility]


   Last Admin: 10/14/18 07:58 Dose:  300 mg


Ondansetron HCl (Zofran)  4 mg IVP Q6H PRN


   PRN Reason: Nausea/Vomiting


Rifaximin (Xifaxan)  550 mg PO BID [facility]


   Last Admin: 10/14/18 07:59 Dose:  550 mg


Risperidone (Risperidone)  3 mg PO HS [facility]


   Last Admin: 10/13/18 19:56 Dose:  3 mg


Saccharomyces Boulardii (Florastor)  250 mg PO DAILY [facility]


   Last Admin: 10/14/18 07:58 Dose:  250 mg


Sertraline HCl (Zoloft)  50 mg PO DAILY [facility]


   Last Admin: 10/14/18 08:00 Dose:  50 mg

## 2018-10-15 VITALS — DIASTOLIC BLOOD PRESSURE: 65 MMHG | SYSTOLIC BLOOD PRESSURE: 124 MMHG | TEMPERATURE: 97.6 F

## 2018-10-15 LAB
ALBUMIN SERPL BCG-MCNC: 3.3 G/DL (ref 3.5–5)
ANION GAP SERPL CALC-SCNC: 13 MMOL/L (ref 10–20)
BUN SERPL-MCNC: 14 MG/DL (ref 9.8–20.1)
BUN/CREAT SERPL: 17.28
CALCIUM SERPL-MCNC: 10 MG/DL (ref 7.8–10.44)
CHLORIDE SERPL-SCNC: 105 MMOL/L (ref 98–107)
CO2 SERPL-SCNC: 26 MMOL/L (ref 22–29)
CREAT CL PREDICTED SERPL C-G-VRATE: 70 ML/MIN (ref 70–130)
GLUCOSE SERPL-MCNC: 114 MG/DL (ref 70–105)
POTASSIUM SERPL-SCNC: 4.4 MMOL/L (ref 3.5–5.1)
SODIUM SERPL-SCNC: 140 MMOL/L (ref 136–145)

## 2018-10-15 RX ADMIN — HEPARIN SODIUM SCH UNITS: 5000 INJECTION, SOLUTION INTRAVENOUS; SUBCUTANEOUS at 11:01

## 2018-10-15 RX ADMIN — VANCOMYCIN HYDROCHLORIDE SCH MG: KIT at 12:59

## 2018-10-15 RX ADMIN — VANCOMYCIN HYDROCHLORIDE SCH MG: KIT at 08:18

## 2018-10-15 RX ADMIN — DIVALPROEX SODIUM SCH MG: 250 TABLET, DELAYED RELEASE ORAL at 08:18

## 2018-10-15 NOTE — DIS
DATE OF ADMISSION:  09/30/2018

 

DATE OF DISCHARGE:  10/15/2018

 

CONDITION AT THE TIME OF DISCHARGE:  Stable and improved.

 

DISCHARGE DIAGNOSES:

1.  Clostridium difficile colitis.

2.  Generalized weakness and deconditioning.

3.  History of bipolar disorder.

4.  Drug abuse.

5.  Dyslipidemia.

6.  Hypertension.

7.  Tobacco abuse.

8.  Acute renal failure, resolved.

 

DISCHARGE MEDICATIONS:  Vancomycin 125 mg p.o. q.i.d. for 10 more days and resume home medications as
 follows:  Depakote 1 tablet daily, risperidone 3 mg at bedtime, lithium carbonate 600 mg at bedtime,
 Zoloft 50 mg daily, Depakote 1 tablet at bedtime, trazodone 100 mg at bedtime, lithium carbonate 300
 mg in the morning, Florastor 250 mg daily, lisinopril 10 mg daily, amlodipine 10 mg daily, Tylenol a
s needed.

 

DISCHARGE DISPOSITION:  Home with home health.

 

PRIMARY CARE PHYSICIAN:  Genevieve Moore, physician assistant.

 

DISCHARGE FOLLOWUP:

1.  Primary care physician.

2.  Gastroenterology, Dr. Jamaal Rdz.

 

HISTORY OF PRESENT ILLNESS:  Ms. Borja is a 62-year-old female with past medical history of hyp
ertension, bipolar disorder, diet controlled diabetes, dyslipidemia and COPD who presented to the [facility] room after she was found down on the floor covered with feces by her [employer] members and neighb
ors.  She was found to be disoriented and very lethargic.  Upon presentation, she had a BUN of 123, c
reatinine of 7.24 with serum bicarbonate of 8.  She reported that she has significant nausea, vomitin
g and diarrhea going on for about 2 weeks.  She was admitted to [facility] for acute renal failure and kristie
re metabolic acidosis.  Nephrology was consulted.  Stool cultures were sent as well.  Dietitian was c
onsulted as the patient was found to be severely malnourished.  Please see admission history and [facility]
ical for further detail.

 

HOSPITAL COURSE:  The patient had gradual improvement in her symptoms.  She tested positive for C. di
ff.  Because of severe dehydration and acute renal failure, she was started on both vancomycin q.i.d.
 orally as well as rifaximin.  Gastroenterology, Dr. Rdz was also consulted.  Difficult to control 
diarrhea.  He recommended checking of various viral serologies as well as iron saturation, alpha 1 tr
ypsin level, smooth muscle and mitochondrial antibody and alpha fetoprotein and continuation of vanco
mycin for a total of 14 days.

 

Nephrology saw the patient and followed the patient along.  She was resuscitated with IV fluids with 
complete resolution of acute renal failure.

 

Slowly, she was moved towards discharge.  Rehab was tried, but denied despite me trying to call to do
 a peer to peer.  I was never connected to a physician.  She was ambulatory in the hallways with the 
physical therapist initially and later independently so rehab was denied, but home health was arrange
d for her.  [facility] has already been involved in the care of this patient.  The patient reports that she 
is dependent on her care for a niece who helps her out.  Niece was contacted by the  and 
discharge plan was discussed with them and they verbalized understanding.  As of now, the patient has
 no specific needs.  She has demonstrated that she is able to take care of herself in the hospital.  
She is eating well and ambulatory and her diarrhea has almost subsided.  She was given prescriptions 
for the antibiotic, which her niece has already collected and she will started them as soon as she ge
ts home.

 

All discharge plan was discussed with the patient who verbalized understanding.  At this time, I do n
ot see any mental or physical barriers for her discharge, though the social barriers were dealt with 
as best as we could do.  [facility]

 

She was seen and examined prior to discharge.

 

PHYSICAL EXAMINATION:

VITAL SIGNS:  Her vital signs are stable.  Blood pressure 124/65, saturating 95% on room air, tempera
ture 97.6, heart rate 76.

GENERAL:  No acute distress.

CHEST:  Clear to auscultation bilaterally.  Rate and rhythm is regular.

 

She was discharged and a taxi voucher was provided to her with the help of the .

 

LABORATORY EXAMINATION:  Her antimitochondrial antibody and smooth muscle antibodies were unremarkabl
e.  Hepatitis panel was negative as well.  Please note that upon presentation, she did test positive 
for phencyclidine and cocaine.  Her lithium level was 1.284.  Discharge BUN 14, discharge creatinine 
0.81.

 

Her rifaximin has been stopped at the time of discharge.  She will continue the vancomycin and follow
 up with primary care physician in 1-2 weeks.  

 

Total time spent in the discharge of this patient, 35 minutes.

 

The patient's stay was prolonged because of denial for the rehabilitation and the question about safe
 discharge.  As of my evaluation this morning, I feel that the patient is able to take care of hersel
f.  She will continue to depend upon her niece for getting her groceries, etc.  She remains high risk
 for readmission because of social reasons.

## 2018-10-17 ENCOUNTER — HOSPITAL ENCOUNTER (EMERGENCY)
Dept: HOSPITAL 92 - ERS | Age: 63
Discharge: HOME | End: 2018-10-17
Payer: MEDICARE

## 2018-10-17 DIAGNOSIS — M54.9: ICD-10-CM

## 2018-10-17 DIAGNOSIS — G89.29: Primary | ICD-10-CM

## 2018-10-17 PROCEDURE — 99283 EMERGENCY DEPT VISIT LOW MDM: CPT
